# Patient Record
Sex: FEMALE | Race: BLACK OR AFRICAN AMERICAN | NOT HISPANIC OR LATINO | Employment: FULL TIME
[De-identification: names, ages, dates, MRNs, and addresses within clinical notes are randomized per-mention and may not be internally consistent; named-entity substitution may affect disease eponyms.]

---

## 2018-01-02 ENCOUNTER — CHARTING TRANS (OUTPATIENT)
Dept: OTHER | Age: 33
End: 2018-01-02

## 2018-03-14 ENCOUNTER — APPOINTMENT (OUTPATIENT)
Dept: LAB | Facility: HOSPITAL | Age: 33
End: 2018-03-14
Attending: OBSTETRICS & GYNECOLOGY
Payer: COMMERCIAL

## 2018-03-14 ENCOUNTER — OFFICE VISIT (OUTPATIENT)
Dept: OBGYN CLINIC | Facility: CLINIC | Age: 33
End: 2018-03-14

## 2018-03-14 VITALS
HEART RATE: 104 BPM | SYSTOLIC BLOOD PRESSURE: 144 MMHG | BODY MASS INDEX: 44 KG/M2 | DIASTOLIC BLOOD PRESSURE: 99 MMHG | WEIGHT: 250.19 LBS

## 2018-03-14 DIAGNOSIS — Z11.3 SCREEN FOR STD (SEXUALLY TRANSMITTED DISEASE): ICD-10-CM

## 2018-03-14 DIAGNOSIS — Z01.419 ENCOUNTER FOR GYNECOLOGICAL EXAMINATION WITHOUT ABNORMAL FINDING: Primary | ICD-10-CM

## 2018-03-14 DIAGNOSIS — D25.1 INTRAMURAL LEIOMYOMA OF UTERUS: ICD-10-CM

## 2018-03-14 PROCEDURE — 86780 TREPONEMA PALLIDUM: CPT

## 2018-03-14 PROCEDURE — 99212 OFFICE O/P EST SF 10 MIN: CPT | Performed by: OBSTETRICS & GYNECOLOGY

## 2018-03-14 PROCEDURE — 87389 HIV-1 AG W/HIV-1&-2 AB AG IA: CPT

## 2018-03-14 PROCEDURE — 87340 HEPATITIS B SURFACE AG IA: CPT

## 2018-03-14 PROCEDURE — 99395 PREV VISIT EST AGE 18-39: CPT | Performed by: OBSTETRICS & GYNECOLOGY

## 2018-03-14 PROCEDURE — 36415 COLL VENOUS BLD VENIPUNCTURE: CPT

## 2018-03-14 RX ORDER — MOXIFLOXACIN 5 MG/ML
SOLUTION/ DROPS OPHTHALMIC
Refills: 0 | COMMUNITY
Start: 2018-01-03 | End: 2018-03-14

## 2018-03-14 NOTE — PROGRESS NOTES
Belgica Araya is a 35year old female  Patient's last menstrual period was 2018. Patient presents with:  Gyn Exam: Annual  Pt has h/o uterine fibroids.   Her menses are monthly but on day #2-3 out of her 4-5 day menses she is changing a super headaches, extremity weakness or numbness. Psychiatric: denies depression or anxiety. Endocrine:   denies excessive thirst or urination. Heme/Lymph:  denies history of anemia, easy bruising or bleeding.       PHYSICAL EXAM:   Constitutional: well develop PELVIS (TRANSABDOMINAL PELVIS)  ([id number])

## 2018-03-15 LAB
HBV SURFACE AG SERPL QL IA: NONREACTIVE
HIV1+2 AB SERPL QL IA: NONREACTIVE

## 2018-03-16 LAB
C TRACH DNA SPEC QL NAA+PROBE: NEGATIVE
N GONORRHOEA DNA SPEC QL NAA+PROBE: NEGATIVE
T PALLIDUM AB SER QL: NEGATIVE
T VAGINALIS RRNA SPEC QL NAA+PROBE: NEGATIVE

## 2018-03-21 ENCOUNTER — TELEPHONE (OUTPATIENT)
Dept: OBGYN CLINIC | Facility: CLINIC | Age: 33
End: 2018-03-21

## 2018-03-21 NOTE — TELEPHONE ENCOUNTER
----- Message from Anil Singh MD sent at 3/18/2018  3:56 PM CDT -----  Negative syphylis,hepatitis,gc/chlamydia,HIV ,call pt

## 2018-04-27 ENCOUNTER — HOSPITAL ENCOUNTER (OUTPATIENT)
Dept: ULTRASOUND IMAGING | Facility: HOSPITAL | Age: 33
Discharge: HOME | End: 2018-04-27
Attending: OBSTETRICS & GYNECOLOGY
Payer: COMMERCIAL

## 2018-04-27 DIAGNOSIS — D25.1 INTRAMURAL LEIOMYOMA OF UTERUS: ICD-10-CM

## 2018-04-27 PROCEDURE — 76856 US EXAM PELVIC COMPLETE: CPT | Performed by: OBSTETRICS & GYNECOLOGY

## 2018-04-27 PROCEDURE — 76830 TRANSVAGINAL US NON-OB: CPT | Performed by: OBSTETRICS & GYNECOLOGY

## 2018-05-09 ENCOUNTER — TELEPHONE (OUTPATIENT)
Dept: OBGYN CLINIC | Facility: CLINIC | Age: 33
End: 2018-05-09

## 2018-05-09 NOTE — TELEPHONE ENCOUNTER
----- Message from Marly Quispe MD sent at 5/1/2018  1:54 PM CDT -----  Pelvic US shows fibroid uterus with little change since her previous US in 2016, will continue to monitor with annual pelvic exams

## 2018-08-20 ENCOUNTER — OFFICE VISIT (OUTPATIENT)
Dept: INTERNAL MEDICINE CLINIC | Facility: CLINIC | Age: 33
End: 2018-08-20
Payer: COMMERCIAL

## 2018-08-20 VITALS
HEART RATE: 101 BPM | DIASTOLIC BLOOD PRESSURE: 96 MMHG | SYSTOLIC BLOOD PRESSURE: 135 MMHG | WEIGHT: 251.69 LBS | TEMPERATURE: 99 F | BODY MASS INDEX: 44.04 KG/M2 | HEIGHT: 63.5 IN

## 2018-08-20 DIAGNOSIS — Z00.00 ANNUAL PHYSICAL EXAM: Primary | ICD-10-CM

## 2018-08-20 DIAGNOSIS — E66.01 MORBIDLY OBESE (HCC): ICD-10-CM

## 2018-08-20 PROBLEM — D25.9 FIBROID UTERUS: Status: ACTIVE | Noted: 2018-08-20

## 2018-08-20 PROCEDURE — 99385 PREV VISIT NEW AGE 18-39: CPT | Performed by: INTERNAL MEDICINE

## 2018-08-20 NOTE — ASSESSMENT & PLAN NOTE
BMI of 43. Check A1c. Discussed with the patient obesity complications including risk for cardiovascular risk events, also degenerative arthritis of joints and spine. A healthy BMI is 25 and less.   Try to decrease white flour products and carbohydrates

## 2018-08-20 NOTE — ASSESSMENT & PLAN NOTE
Normal physical exam.  Labs ordered. Discussed with the patient about age appropriate screening and immunization. Advised healthy lifestyle with regular exercise and modification of the diet  Avoid processed, frozen food and sweetened beverages.

## 2018-08-20 NOTE — PROGRESS NOTES
Lo Holland is a 35year old female. Patient presents with:  Physical      HPI:     HPI   She is here as a new patient for annual physical. She is overall well. She has been doing weight watches for about 1 month. She had been slow on weight loss.  She d and headaches. Endo/Heme/Allergies: Negative for environmental allergies. Psychiatric/Behavioral: Negative for depression. The patient is not nervous/anxious and does not have insomnia.           EXAM:   BP (!) 135/96 (BP Location: Right arm, Cuff Size: This Visit        Other    Morbidly obese ([address]     BMI of 43. Check A1c. Discussed with the patient obesity complications including risk for cardiovascular risk events, also degenerative arthritis of joints and spine. A healthy BMI is 25 and less.   Try

## 2018-09-01 ENCOUNTER — LAB ENCOUNTER (OUTPATIENT)
Dept: LAB | Facility: HOSPITAL | Age: 33
End: 2018-09-01
Attending: INTERNAL MEDICINE
Payer: COMMERCIAL

## 2018-09-01 DIAGNOSIS — Z00.00 ANNUAL PHYSICAL EXAM: ICD-10-CM

## 2018-09-01 DIAGNOSIS — E66.01 MORBIDLY OBESE (HCC): ICD-10-CM

## 2018-09-01 LAB
ALBUMIN SERPL BCP-MCNC: 3.8 G/DL (ref 3.5–4.8)
ALBUMIN/GLOB SERPL: 1.1 {RATIO} (ref 1–2)
ALP SERPL-CCNC: 64 U/L (ref 32–100)
ALT SERPL-CCNC: 13 U/L (ref 14–54)
ANION GAP SERPL CALC-SCNC: 7 MMOL/L (ref 0–18)
AST SERPL-CCNC: 19 U/L (ref 15–41)
BASOPHILS # BLD: 0 K/UL (ref 0–0.2)
BASOPHILS NFR BLD: 1 %
BILIRUB SERPL-MCNC: 0.5 MG/DL (ref 0.3–1.2)
BUN SERPL-MCNC: 10 MG/DL (ref 8–20)
BUN/CREAT SERPL: 10.6 (ref 10–20)
CALCIUM SERPL-MCNC: 9.2 MG/DL (ref 8.5–10.5)
CHLORIDE SERPL-SCNC: 106 MMOL/L (ref 95–110)
CHOLEST SERPL-MCNC: 177 MG/DL (ref 110–200)
CO2 SERPL-SCNC: 26 MMOL/L (ref 22–32)
CREAT SERPL-MCNC: 0.94 MG/DL (ref 0.5–1.5)
EOSINOPHIL # BLD: 0.1 K/UL (ref 0–0.7)
EOSINOPHIL NFR BLD: 3 %
ERYTHROCYTE [DISTWIDTH] IN BLOOD BY AUTOMATED COUNT: 15.6 % (ref 11–15)
GLOBULIN PLAS-MCNC: 3.4 G/DL (ref 2.5–3.7)
GLUCOSE SERPL-MCNC: 94 MG/DL (ref 70–99)
HBA1C MFR BLD: 5.2 % (ref 4–6)
HCT VFR BLD AUTO: 41.9 % (ref 35–48)
HDLC SERPL-MCNC: 64 MG/DL
HGB BLD-MCNC: 13.6 G/DL (ref 12–16)
LDLC SERPL CALC-MCNC: 102 MG/DL (ref 0–99)
LYMPHOCYTES # BLD: 2 K/UL (ref 1–4)
LYMPHOCYTES NFR BLD: 42 %
MCH RBC QN AUTO: 26.6 PG (ref 27–32)
MCHC RBC AUTO-ENTMCNC: 32.5 G/DL (ref 32–37)
MCV RBC AUTO: 81.8 FL (ref 80–100)
MONOCYTES # BLD: 0.6 K/UL (ref 0–1)
MONOCYTES NFR BLD: 12 %
NEUTROPHILS # BLD AUTO: 2.1 K/UL (ref 1.8–7.7)
NEUTROPHILS NFR BLD: 43 %
NONHDLC SERPL-MCNC: 113 MG/DL
OSMOLALITY UR CALC.SUM OF ELEC: 287 MOSM/KG (ref 275–295)
PATIENT FASTING: YES
PLATELET # BLD AUTO: 297 K/UL (ref 140–400)
PMV BLD AUTO: 11.1 FL (ref 7.4–10.3)
POTASSIUM SERPL-SCNC: 4.2 MMOL/L (ref 3.3–5.1)
PROT SERPL-MCNC: 7.2 G/DL (ref 5.9–8.4)
RBC # BLD AUTO: 5.12 M/UL (ref 3.7–5.4)
SODIUM SERPL-SCNC: 139 MMOL/L (ref 136–144)
TRIGL SERPL-MCNC: 55 MG/DL (ref 1–149)
TSH SERPL-ACNC: 1.53 UIU/ML (ref 0.45–5.33)
WBC # BLD AUTO: 4.9 K/UL (ref 4–11)

## 2018-09-01 PROCEDURE — 84443 ASSAY THYROID STIM HORMONE: CPT

## 2018-09-01 PROCEDURE — 83036 HEMOGLOBIN GLYCOSYLATED A1C: CPT

## 2018-09-01 PROCEDURE — 80061 LIPID PANEL: CPT

## 2018-09-01 PROCEDURE — 80053 COMPREHEN METABOLIC PANEL: CPT

## 2018-09-01 PROCEDURE — 85025 COMPLETE CBC W/AUTO DIFF WBC: CPT

## 2018-09-01 PROCEDURE — 36415 COLL VENOUS BLD VENIPUNCTURE: CPT

## 2018-11-02 VITALS
BODY MASS INDEX: 43.19 KG/M2 | HEIGHT: 63 IN | RESPIRATION RATE: 20 BRPM | DIASTOLIC BLOOD PRESSURE: 80 MMHG | TEMPERATURE: 98.2 F | WEIGHT: 243.76 LBS | HEART RATE: 88 BPM | SYSTOLIC BLOOD PRESSURE: 120 MMHG

## 2018-12-12 ENCOUNTER — TELEPHONE (OUTPATIENT)
Dept: OBGYN CLINIC | Facility: CLINIC | Age: 33
End: 2018-12-12

## 2018-12-12 ENCOUNTER — OFFICE VISIT (OUTPATIENT)
Dept: OBGYN CLINIC | Facility: CLINIC | Age: 33
End: 2018-12-12
Payer: COMMERCIAL

## 2018-12-12 VITALS
DIASTOLIC BLOOD PRESSURE: 84 MMHG | WEIGHT: 246 LBS | SYSTOLIC BLOOD PRESSURE: 128 MMHG | HEART RATE: 98 BPM | BODY MASS INDEX: 43 KG/M2

## 2018-12-12 DIAGNOSIS — G43.829 MENSTRUAL MIGRAINE WITHOUT STATUS MIGRAINOSUS, NOT INTRACTABLE: Primary | ICD-10-CM

## 2018-12-12 DIAGNOSIS — G43.A0 CYCLICAL VOMITING WITH NAUSEA, INTRACTABILITY OF VOMITING NOT SPECIFIED: ICD-10-CM

## 2018-12-12 DIAGNOSIS — R03.0 ELEVATED BLOOD PRESSURE READING IN OFFICE WITHOUT DIAGNOSIS OF HYPERTENSION: ICD-10-CM

## 2018-12-12 PROCEDURE — 99213 OFFICE O/P EST LOW 20 MIN: CPT | Performed by: OBSTETRICS & GYNECOLOGY

## 2018-12-12 NOTE — PROGRESS NOTES
Felix Bryan    1/17/1985       Patient presents with:  Gyn Problem: MENSTRUAL MIGRAINES / Ambrosio O[place] / VOMITING  For the past 4-6 months she has had severe migraines with nausea and vomiting- she has n/v for the first 2 days of menses.   She monitors what s intractability of vomiting not specified    Other orders  -     Ondansetron 4 MG Oral Film; Take 1 tablet by mouth 3 (three) times daily for 7 days.       Recommend suppressing ovulation to see if the migraines are being caused by hormonal fluctuations rela

## 2018-12-12 NOTE — TELEPHONE ENCOUNTER
Ten states rx was written as film, but they only have tabs. Per CAP, she wants tabs. Pharmacy notified.

## 2018-12-30 ENCOUNTER — HOSPITAL ENCOUNTER (OUTPATIENT)
Age: 33
Discharge: HOME | End: 2018-12-30
Attending: EMERGENCY MEDICINE
Payer: COMMERCIAL

## 2018-12-30 VITALS
DIASTOLIC BLOOD PRESSURE: 91 MMHG | WEIGHT: 246 LBS | RESPIRATION RATE: 16 BRPM | SYSTOLIC BLOOD PRESSURE: 132 MMHG | HEART RATE: 101 BPM | OXYGEN SATURATION: 99 % | BODY MASS INDEX: 43 KG/M2 | TEMPERATURE: 99 F

## 2018-12-30 DIAGNOSIS — J01.90 ACUTE SINUSITIS, RECURRENCE NOT SPECIFIED, UNSPECIFIED LOCATION: Primary | ICD-10-CM

## 2018-12-30 PROCEDURE — 99214 OFFICE O/P EST MOD 30 MIN: CPT

## 2018-12-30 PROCEDURE — 99213 OFFICE O/P EST LOW 20 MIN: CPT

## 2018-12-30 RX ORDER — AZITHROMYCIN 250 MG/1
TABLET, FILM COATED ORAL
Qty: 1 PACKAGE | Refills: 0 | Status: SHIPPED | OUTPATIENT
Start: 2018-12-30 | End: 2019-01-04

## 2018-12-30 RX ORDER — FLUTICASONE PROPIONATE 50 MCG
2 SPRAY, SUSPENSION (ML) NASAL DAILY
Qty: 16 G | Refills: 0 | Status: SHIPPED | OUTPATIENT
Start: 2018-12-30 | End: 2019-01-29

## 2018-12-30 NOTE — ED PROVIDER NOTES
Patient Seen in: [facility] In [address] [place] [address]    History   Patient presents with:  Ear Problem Pain (neurosensory)    Stated Complaint: sinus    HPI    Patient presents to the immediate care center today complaining of sinus congestion, f motion without pain. Neck supple. No neck rigidity. Normal range of motion present. Cardiovascular: Normal rate and regular rhythm. No murmur heard. Pulmonary/Chest: Effort normal and breath sounds normal. No respiratory distress. Abdominal: Soft.  Chandler Frye

## 2019-09-23 ENCOUNTER — TELEPHONE (OUTPATIENT)
Dept: OBGYN CLINIC | Facility: CLINIC | Age: 34
End: 2019-09-23

## 2019-09-23 ENCOUNTER — LAB ENCOUNTER (OUTPATIENT)
Dept: LAB | Facility: HOSPITAL | Age: 34
End: 2019-09-23
Attending: OBSTETRICS & GYNECOLOGY
Payer: COMMERCIAL

## 2019-09-23 ENCOUNTER — OFFICE VISIT (OUTPATIENT)
Dept: OBGYN CLINIC | Facility: CLINIC | Age: 34
End: 2019-09-23
Payer: COMMERCIAL

## 2019-09-23 VITALS
HEART RATE: 87 BPM | BODY MASS INDEX: 43.51 KG/M2 | HEIGHT: 63.5 IN | DIASTOLIC BLOOD PRESSURE: 88 MMHG | WEIGHT: 248.63 LBS | SYSTOLIC BLOOD PRESSURE: 116 MMHG

## 2019-09-23 DIAGNOSIS — D21.9 FIBROIDS: ICD-10-CM

## 2019-09-23 DIAGNOSIS — Z01.419 ENCOUNTER FOR GYNECOLOGICAL EXAMINATION WITHOUT ABNORMAL FINDING: Primary | ICD-10-CM

## 2019-09-23 DIAGNOSIS — Z11.3 SCREEN FOR STD (SEXUALLY TRANSMITTED DISEASE): ICD-10-CM

## 2019-09-23 LAB
HBV SURFACE AG SER-ACNC: <0.1 [IU]/L
HBV SURFACE AG SERPL QL IA: NONREACTIVE

## 2019-09-23 PROCEDURE — 86780 TREPONEMA PALLIDUM: CPT

## 2019-09-23 PROCEDURE — 36415 COLL VENOUS BLD VENIPUNCTURE: CPT

## 2019-09-23 PROCEDURE — 87389 HIV-1 AG W/HIV-1&-2 AB AG IA: CPT

## 2019-09-23 PROCEDURE — 99395 PREV VISIT EST AGE 18-39: CPT | Performed by: OBSTETRICS & GYNECOLOGY

## 2019-09-23 PROCEDURE — 87340 HEPATITIS B SURFACE AG IA: CPT

## 2019-09-23 RX ORDER — AZITHROMYCIN 250 MG/1
TABLET, FILM COATED ORAL
Refills: 0 | COMMUNITY
Start: 2019-09-06 | End: 2019-10-23

## 2019-09-23 RX ORDER — FLUCONAZOLE 150 MG/1
TABLET ORAL
Refills: 0 | COMMUNITY
Start: 2019-09-06 | End: 2019-10-23

## 2019-09-23 NOTE — PROGRESS NOTES
Latrell Queen is a 29year old female [facility] Patient's last menstrual period was 08/22/2019. Patient presents with:  Gyn Exam: annual exam  Her cycles are  Regular but now every 32-35 days. She states that she had a normal  TSH.   I reassured her that sh partner violence:        Fear of current or ex partner: Not on file        Emotionally abused: Not on file        Physically abused: Not on file        Forced sexual activity: Not on file    Other Topics      Concerns:        Not on file    Social History bilaterally  Cardiovascular: regular rate and rhythm, no significant murmur  Abdomen:  soft, nontender, nondistended, no masses  Skin/Hair: no unusual rashes or bruises  Extremities: no edema, no cyanosis  Psychiatric:  Oriented to time, place, person and

## 2019-09-24 LAB
C TRACH DNA SPEC QL NAA+PROBE: NEGATIVE
HPV I/H RISK 1 DNA SPEC QL NAA+PROBE: NEGATIVE
N GONORRHOEA DNA SPEC QL NAA+PROBE: NEGATIVE

## 2019-09-25 LAB — T PALLIDUM AB SER QL: NEGATIVE

## 2019-09-26 LAB — LAST PAP RESULT: NORMAL

## 2019-10-21 ENCOUNTER — HOSPITAL ENCOUNTER (OUTPATIENT)
Dept: ULTRASOUND IMAGING | Facility: HOSPITAL | Age: 34
Discharge: HOME | End: 2019-10-21
Attending: OBSTETRICS & GYNECOLOGY
Payer: COMMERCIAL

## 2019-10-21 DIAGNOSIS — D21.9 FIBROIDS: ICD-10-CM

## 2019-10-21 PROCEDURE — 76830 TRANSVAGINAL US NON-OB: CPT | Performed by: OBSTETRICS & GYNECOLOGY

## 2019-10-21 PROCEDURE — 76856 US EXAM PELVIC COMPLETE: CPT | Performed by: OBSTETRICS & GYNECOLOGY

## 2019-10-23 ENCOUNTER — OFFICE VISIT (OUTPATIENT)
Dept: FAMILY MEDICINE CLINIC | Facility: CLINIC | Age: 34
End: 2019-10-23
Payer: COMMERCIAL

## 2019-10-23 VITALS
OXYGEN SATURATION: 98 % | HEART RATE: 100 BPM | WEIGHT: 253 LBS | SYSTOLIC BLOOD PRESSURE: 122 MMHG | DIASTOLIC BLOOD PRESSURE: 60 MMHG | BODY MASS INDEX: 44.83 KG/M2 | HEIGHT: 63 IN

## 2019-10-23 DIAGNOSIS — Z71.3 WEIGHT LOSS COUNSELING, ENCOUNTER FOR: ICD-10-CM

## 2019-10-23 DIAGNOSIS — Z00.00 WELLNESS EXAMINATION: Primary | ICD-10-CM

## 2019-10-23 DIAGNOSIS — Z00.00 HEALTHCARE MAINTENANCE: ICD-10-CM

## 2019-10-23 PROCEDURE — 99385 PREV VISIT NEW AGE 18-39: CPT | Performed by: FAMILY MEDICINE

## 2019-10-23 NOTE — PROGRESS NOTES
CC: Annual Physical Exam    HPI:   Christy Pena is a 29year old female who presents for a complete physical exam.    HCM  -Diet:  Well-balanced.   -Exercise regularly  -Mental Health: denies any depression or anxiety sx  -Skin care:  no concerning lesion Respiratory: Negative for cough and shortness of breath. Cardiovascular: Negative for chest pain, palpitations and leg swelling. Gastrointestinal: Negative for vomiting, abdominal pain, diarrhea and constipation.    Musculoskeletal: Negative for join Platelet, No Differential [E]      Comp Metabolic Panel (14) [E]      Hemoglobin A1C (Glycohemoglobin) [E]      Lipid Panel [E]      TSH W Reflex To Free T4 [E]      Diagnoses and all orders for this visit:    Wellness examination  Healthcare maintenance

## 2019-12-12 ENCOUNTER — TELEPHONE (OUTPATIENT)
Dept: FAMILY MEDICINE CLINIC | Facility: CLINIC | Age: 34
End: 2019-12-12

## 2020-02-02 ENCOUNTER — APPOINTMENT (OUTPATIENT)
Dept: LAB | Facility: REFERENCE LAB | Age: 35
End: 2020-02-02
Attending: FAMILY MEDICINE
Payer: COMMERCIAL

## 2020-02-02 DIAGNOSIS — Z00.00 HEALTHCARE MAINTENANCE: ICD-10-CM

## 2020-02-02 LAB
ALBUMIN SERPL-MCNC: 3.4 G/DL (ref 3.4–5)
ALBUMIN/GLOB SERPL: 0.9 {RATIO} (ref 1–2)
ALP LIVER SERPL-CCNC: 75 U/L (ref 37–98)
ALT SERPL-CCNC: 14 U/L (ref 13–56)
ANION GAP SERPL CALC-SCNC: 6 MMOL/L (ref 0–18)
AST SERPL-CCNC: 9 U/L (ref 15–37)
BILIRUB SERPL-MCNC: 0.4 MG/DL (ref 0.1–2)
BUN BLD-MCNC: 10 MG/DL (ref 7–18)
BUN/CREAT SERPL: 10.3 (ref 10–20)
CALCIUM BLD-MCNC: 8.9 MG/DL (ref 8.5–10.1)
CHLORIDE SERPL-SCNC: 108 MMOL/L (ref 98–112)
CHOLEST SMN-MCNC: 171 MG/DL (ref ?–200)
CO2 SERPL-SCNC: 26 MMOL/L (ref 21–32)
CREAT BLD-MCNC: 0.97 MG/DL (ref 0.55–1.02)
DEPRECATED RDW RBC AUTO: 42.8 FL (ref 35.1–46.3)
ERYTHROCYTE [DISTWIDTH] IN BLOOD BY AUTOMATED COUNT: 14.8 % (ref 11–15)
EST. AVERAGE GLUCOSE BLD GHB EST-MCNC: 100 MG/DL (ref 68–126)
GLOBULIN PLAS-MCNC: 3.8 G/DL (ref 2.8–4.4)
GLUCOSE BLD-MCNC: 81 MG/DL (ref 70–99)
HBA1C MFR BLD HPLC: 5.1 % (ref ?–5.7)
HCT VFR BLD AUTO: 41.8 % (ref 35–48)
HDLC SERPL-MCNC: 69 MG/DL (ref 40–59)
HGB BLD-MCNC: 13.9 G/DL (ref 12–16)
LDLC SERPL CALC-MCNC: 89 MG/DL (ref ?–100)
M PROTEIN MFR SERPL ELPH: 7.2 G/DL (ref 6.4–8.2)
MCH RBC QN AUTO: 27.1 PG (ref 26–34)
MCHC RBC AUTO-ENTMCNC: 33.3 G/DL (ref 31–37)
MCV RBC AUTO: 81.5 FL (ref 80–100)
NONHDLC SERPL-MCNC: 102 MG/DL (ref ?–130)
OSMOLALITY SERPL CALC.SUM OF ELEC: 288 MOSM/KG (ref 275–295)
PATIENT FASTING Y/N/NP: YES
PATIENT FASTING Y/N/NP: YES
PLATELET # BLD AUTO: 298 10(3)UL (ref 150–450)
POTASSIUM SERPL-SCNC: 3.9 MMOL/L (ref 3.5–5.1)
RBC # BLD AUTO: 5.13 X10(6)UL (ref 3.8–5.3)
SODIUM SERPL-SCNC: 140 MMOL/L (ref 136–145)
TRIGL SERPL-MCNC: 64 MG/DL (ref 30–149)
TSI SER-ACNC: 1.06 MIU/ML (ref 0.36–3.74)
VLDLC SERPL CALC-MCNC: 13 MG/DL (ref 0–30)
WBC # BLD AUTO: 4.9 X10(3) UL (ref 4–11)

## 2020-02-02 PROCEDURE — 84443 ASSAY THYROID STIM HORMONE: CPT

## 2020-02-02 PROCEDURE — 80053 COMPREHEN METABOLIC PANEL: CPT

## 2020-02-02 PROCEDURE — 83036 HEMOGLOBIN GLYCOSYLATED A1C: CPT

## 2020-02-02 PROCEDURE — 85027 COMPLETE CBC AUTOMATED: CPT

## 2020-02-02 PROCEDURE — 36415 COLL VENOUS BLD VENIPUNCTURE: CPT

## 2020-02-02 PROCEDURE — 80061 LIPID PANEL: CPT

## 2020-02-05 ENCOUNTER — TELEPHONE (OUTPATIENT)
Dept: FAMILY MEDICINE CLINIC | Facility: CLINIC | Age: 35
End: 2020-02-05

## 2020-02-05 NOTE — TELEPHONE ENCOUNTER
Results were already viewed by the patient under My Chart but were discussed with her as well, she verbalized understanding.

## 2020-02-05 NOTE — TELEPHONE ENCOUNTER
----- Message from Laron Murillo MD sent at 2/3/2020  5:45 PM CST -----  Please let her know that her labs are normal (cholesterol, thyroid, diabetes test, blood count, kidney, liver, eletrolytes)

## 2020-11-06 ENCOUNTER — OFFICE VISIT (OUTPATIENT)
Dept: OBGYN CLINIC | Facility: CLINIC | Age: 35
End: 2020-11-06
Payer: COMMERCIAL

## 2020-11-06 VITALS
HEIGHT: 63 IN | DIASTOLIC BLOOD PRESSURE: 90 MMHG | HEART RATE: 96 BPM | BODY MASS INDEX: 42.77 KG/M2 | WEIGHT: 241.38 LBS | SYSTOLIC BLOOD PRESSURE: 130 MMHG

## 2020-11-06 DIAGNOSIS — Z11.3 SCREENING EXAMINATION FOR STD (SEXUALLY TRANSMITTED DISEASE): ICD-10-CM

## 2020-11-06 DIAGNOSIS — Z01.419 ENCOUNTER FOR GYNECOLOGICAL EXAMINATION WITHOUT ABNORMAL FINDING: Primary | ICD-10-CM

## 2020-11-06 PROCEDURE — 99395 PREV VISIT EST AGE 18-39: CPT | Performed by: OBSTETRICS & GYNECOLOGY

## 2020-11-06 PROCEDURE — 3008F BODY MASS INDEX DOCD: CPT | Performed by: OBSTETRICS & GYNECOLOGY

## 2020-11-06 PROCEDURE — 3080F DIAST BP >= 90 MM HG: CPT | Performed by: OBSTETRICS & GYNECOLOGY

## 2020-11-06 PROCEDURE — 99072 ADDL SUPL MATRL&STAF TM PHE: CPT | Performed by: OBSTETRICS & GYNECOLOGY

## 2020-11-06 PROCEDURE — 3075F SYST BP GE 130 - 139MM HG: CPT | Performed by: OBSTETRICS & GYNECOLOGY

## 2020-11-06 NOTE — PROGRESS NOTES
Soni Umaña is a 28year old female  Patient's last menstrual period was 10/20/2020. Patient presents with:  Gyn Exam: ANNUAL EXAM  .     Her cycles are regular. She has no complaints.       OBSTETRICS HISTORY:  OB History     T0    L Not on file    Other Topics      Concerns:        Not on file    Social History Narrative      Not on file      FAMILY HISTORY:  Family History   Problem Relation Age of Onset   • Hypertension Father    • Hypertension Mother    • Diabetes Mother    • Cance Genitalia: normal appearance, hair distribution, and no lesions  Urethral Meatus:  normal in size, location, without lesions and prolapse  Bladder:  No fullness, masses or tenderness  Vagina:  Normal appearance without lesions, no abnormal discharge  [place]

## 2020-11-09 ENCOUNTER — OFFICE VISIT (OUTPATIENT)
Dept: FAMILY MEDICINE CLINIC | Facility: CLINIC | Age: 35
End: 2020-11-09
Payer: COMMERCIAL

## 2020-11-09 VITALS
HEIGHT: 63 IN | DIASTOLIC BLOOD PRESSURE: 82 MMHG | OXYGEN SATURATION: 99 % | WEIGHT: 242 LBS | HEART RATE: 104 BPM | SYSTOLIC BLOOD PRESSURE: 126 MMHG | BODY MASS INDEX: 42.88 KG/M2

## 2020-11-09 DIAGNOSIS — Z20.822 EXPOSURE TO COVID-19 VIRUS: ICD-10-CM

## 2020-11-09 DIAGNOSIS — Z00.00 WELLNESS EXAMINATION: Primary | ICD-10-CM

## 2020-11-09 DIAGNOSIS — Z23 NEED FOR VACCINATION: ICD-10-CM

## 2020-11-09 DIAGNOSIS — Z00.00 HEALTHCARE MAINTENANCE: ICD-10-CM

## 2020-11-09 PROCEDURE — 90686 IIV4 VACC NO PRSV 0.5 ML IM: CPT | Performed by: FAMILY MEDICINE

## 2020-11-09 PROCEDURE — 99072 ADDL SUPL MATRL&STAF TM PHE: CPT | Performed by: FAMILY MEDICINE

## 2020-11-09 PROCEDURE — 3008F BODY MASS INDEX DOCD: CPT | Performed by: FAMILY MEDICINE

## 2020-11-09 PROCEDURE — 3074F SYST BP LT 130 MM HG: CPT | Performed by: FAMILY MEDICINE

## 2020-11-09 PROCEDURE — 99395 PREV VISIT EST AGE 18-39: CPT | Performed by: FAMILY MEDICINE

## 2020-11-09 PROCEDURE — 3079F DIAST BP 80-89 MM HG: CPT | Performed by: FAMILY MEDICINE

## 2020-11-09 PROCEDURE — 90471 IMMUNIZATION ADMIN: CPT | Performed by: FAMILY MEDICINE

## 2020-11-09 NOTE — PROGRESS NOTES
CC: Annual Physical Exam    HPI:   Gabi Dobson is a 28year old female who presents for a complete physical exam.    HCM  -Diet:  Well-balanced.  Started a diet program with work and has lost 10lbs so far!!  -Exercise active  -Mental Health: denies any de Negative for vomiting, abdominal pain, diarrhea and constipation. Musculoskeletal: Negative for joint pain. Neurological: Negative for headaches. All other systems reviewed and are negative.            PHYSICAL EXAM:   /82   Pulse 104   Ht 63\" [E]      Diagnoses and all orders for this visit:    Wellness examination  Healthcare maintenance  -     CBC WITH DIFFERENTIAL WITH PLATELET  -     COMP METABOLIC PANEL (14); Future  -     LIPID PANEL;  Future  -Vital signs reviewed and wnl  -PE wnl  -Body

## 2020-11-19 ENCOUNTER — LAB ENCOUNTER (OUTPATIENT)
Dept: LAB | Facility: HOSPITAL | Age: 35
End: 2020-11-19
Attending: OBSTETRICS & GYNECOLOGY
Payer: COMMERCIAL

## 2020-11-19 DIAGNOSIS — Z20.822 EXPOSURE TO COVID-19 VIRUS: ICD-10-CM

## 2020-11-19 DIAGNOSIS — Z11.3 SCREENING EXAMINATION FOR STD (SEXUALLY TRANSMITTED DISEASE): ICD-10-CM

## 2020-11-19 PROCEDURE — 87389 HIV-1 AG W/HIV-1&-2 AB AG IA: CPT

## 2020-11-19 PROCEDURE — 86780 TREPONEMA PALLIDUM: CPT

## 2020-11-19 PROCEDURE — 87340 HEPATITIS B SURFACE AG IA: CPT

## 2020-11-19 PROCEDURE — 36415 COLL VENOUS BLD VENIPUNCTURE: CPT

## 2020-11-19 PROCEDURE — 86769 SARS-COV-2 COVID-19 ANTIBODY: CPT

## 2020-11-22 ENCOUNTER — HOSPITAL ENCOUNTER (EMERGENCY)
Age: 35
Discharge: HOME | End: 2020-11-22

## 2020-11-22 VITALS
RESPIRATION RATE: 18 BRPM | HEART RATE: 90 BPM | DIASTOLIC BLOOD PRESSURE: 99 MMHG | SYSTOLIC BLOOD PRESSURE: 140 MMHG | OXYGEN SATURATION: 98 % | TEMPERATURE: 98.6 F

## 2020-11-22 DIAGNOSIS — I10 HYPERTENSION, UNSPECIFIED TYPE: Primary | ICD-10-CM

## 2020-11-22 LAB
APPEARANCE UR: CLEAR
BILIRUB UR QL STRIP: NEGATIVE
COLOR UR: YELLOW
GLUCOSE UR STRIP-MCNC: NEGATIVE MG/DL
HCG UR QL: NEGATIVE
HGB UR QL STRIP: NEGATIVE
KETONES UR STRIP-MCNC: NEGATIVE MG/DL
LEUKOCYTE ESTERASE UR QL STRIP: NEGATIVE
NITRITE UR QL STRIP: NEGATIVE
PH UR STRIP: 5.5 UNITS (ref 5–7)
PROT UR STRIP-MCNC: NEGATIVE MG/DL
SP GR UR STRIP: 1.01 (ref 1–1.03)
UROBILINOGEN UR STRIP-MCNC: 0.2 MG/DL (ref 0–1)

## 2020-11-22 PROCEDURE — 84703 CHORIONIC GONADOTROPIN ASSAY: CPT

## 2020-11-22 PROCEDURE — 81003 URINALYSIS AUTO W/O SCOPE: CPT

## 2020-11-22 PROCEDURE — 99283 EMERGENCY DEPT VISIT LOW MDM: CPT | Performed by: NURSE PRACTITIONER

## 2020-11-22 PROCEDURE — 99283 EMERGENCY DEPT VISIT LOW MDM: CPT

## 2020-11-22 ASSESSMENT — ENCOUNTER SYMPTOMS
NAUSEA: 0
TROUBLE SWALLOWING: 0
EYE PAIN: 0
WEAKNESS: 0
EYE ITCHING: 0
COUGH: 0
SLEEP DISTURBANCE: 0
FACIAL SWELLING: 0
TREMORS: 0
NUMBNESS: 0
ACTIVITY CHANGE: 0
STRIDOR: 0
DIARRHEA: 0
VOMITING: 0
DIAPHORESIS: 0
FATIGUE: 0
LIGHT-HEADEDNESS: 0
NERVOUS/ANXIOUS: 0
FACIAL ASYMMETRY: 0
CONFUSION: 0
SEIZURES: 0
BACK PAIN: 0
APPETITE CHANGE: 0
ABDOMINAL PAIN: 0
SORE THROAT: 0
BRUISES/BLEEDS EASILY: 0
COLOR CHANGE: 0
SHORTNESS OF BREATH: 0
EYE DISCHARGE: 0
HEADACHES: 0
WHEEZING: 0
FEVER: 0
SPEECH DIFFICULTY: 0
DIZZINESS: 0
CHILLS: 0
SINUS PAIN: 0

## 2020-11-22 ASSESSMENT — PAIN SCALES - GENERAL: PAINLEVEL_OUTOF10: 0

## 2020-11-23 ENCOUNTER — TELEPHONE (OUTPATIENT)
Dept: FAMILY MEDICINE CLINIC | Facility: CLINIC | Age: 35
End: 2020-11-23

## 2020-11-23 NOTE — TELEPHONE ENCOUNTER
Spoke to patient. Lauren speaking in full sentences in no apparent distress. Denies chest pain, SOB, difficulty breathing, numbness/tingling in extremities, headache, blurry vision.     She endorses that prior to ER visit, the night before she ate a whole

## 2020-11-23 NOTE — TELEPHONE ENCOUNTER
Patient states she was seen at [facility] ER over the weekend she states she has been under some stress due to recent family problems.  Patient states her blood pressure was 149/102 when she was seen and she was told to make a follow yo appoi

## 2020-12-09 ENCOUNTER — PATIENT MESSAGE (OUTPATIENT)
Dept: FAMILY MEDICINE CLINIC | Facility: CLINIC | Age: 35
End: 2020-12-09

## 2020-12-11 NOTE — TELEPHONE ENCOUNTER
Spoke to patient. Offered appt on Monday 12/14 with Dr. Denise Fajardo at 3:20, but unable to make it in due to schedule conflict. Patient scheduled Thu 12/17 with Dr. Denise Fajardo at 2pm.    In the interim, continue monitoring and logging BP readings.  Decrease sa

## 2020-12-17 ENCOUNTER — OFFICE VISIT (OUTPATIENT)
Dept: FAMILY MEDICINE CLINIC | Facility: CLINIC | Age: 35
End: 2020-12-17
Payer: COMMERCIAL

## 2020-12-17 VITALS
HEIGHT: 63 IN | DIASTOLIC BLOOD PRESSURE: 80 MMHG | HEART RATE: 107 BPM | SYSTOLIC BLOOD PRESSURE: 132 MMHG | OXYGEN SATURATION: 97 % | BODY MASS INDEX: 43.23 KG/M2 | WEIGHT: 244 LBS

## 2020-12-17 DIAGNOSIS — I10 ESSENTIAL HYPERTENSION: Primary | ICD-10-CM

## 2020-12-17 PROCEDURE — 3008F BODY MASS INDEX DOCD: CPT | Performed by: FAMILY MEDICINE

## 2020-12-17 PROCEDURE — 3075F SYST BP GE 130 - 139MM HG: CPT | Performed by: FAMILY MEDICINE

## 2020-12-17 PROCEDURE — 3079F DIAST BP 80-89 MM HG: CPT | Performed by: FAMILY MEDICINE

## 2020-12-17 PROCEDURE — 99214 OFFICE O/P EST MOD 30 MIN: CPT | Performed by: FAMILY MEDICINE

## 2020-12-17 RX ORDER — LISINOPRIL 5 MG/1
5 TABLET ORAL DAILY
Qty: 30 TABLET | Refills: 1 | Status: SHIPPED | OUTPATIENT
Start: 2020-12-17 | End: 2021-01-18 | Stop reason: SINTOL

## 2020-12-17 NOTE — PROGRESS NOTES
HPI:   Patient presents with:  Blood Pressure      Benson Gonzaelz is a 28year old female presenting for:    3wks ago checked her BP at a friends's house with a reading of 140/100's.  Went to ER after being alarmed by friend but ultimately was sent home fo Known Allergies   Social History:  Social History    Tobacco Use      Smoking status: Never Smoker      Smokeless tobacco: Never Used    Alcohol use:  Yes      Alcohol/week: 0.0 standard drinks      Comment: occa    Drug use: No     Family History:  Family Tab; Take 1 tablet (5 mg total) by mouth daily. -NEWLY DX  -start meds  -watch salt intake, regular exercise, DASH/heart healthy eating  -monitor home BP daily and maintain log; if elevated reading >160/100 or <100/60 notify Md.           Return in about

## 2021-01-18 ENCOUNTER — OFFICE VISIT (OUTPATIENT)
Dept: FAMILY MEDICINE CLINIC | Facility: CLINIC | Age: 36
End: 2021-01-18
Payer: COMMERCIAL

## 2021-01-18 VITALS
HEIGHT: 63 IN | HEART RATE: 109 BPM | SYSTOLIC BLOOD PRESSURE: 112 MMHG | BODY MASS INDEX: 43.94 KG/M2 | WEIGHT: 248 LBS | DIASTOLIC BLOOD PRESSURE: 84 MMHG | OXYGEN SATURATION: 98 %

## 2021-01-18 DIAGNOSIS — M54.32 SCIATICA OF LEFT SIDE: ICD-10-CM

## 2021-01-18 DIAGNOSIS — I10 ESSENTIAL HYPERTENSION: Primary | ICD-10-CM

## 2021-01-18 PROCEDURE — 3074F SYST BP LT 130 MM HG: CPT | Performed by: FAMILY MEDICINE

## 2021-01-18 PROCEDURE — 3008F BODY MASS INDEX DOCD: CPT | Performed by: FAMILY MEDICINE

## 2021-01-18 PROCEDURE — 99214 OFFICE O/P EST MOD 30 MIN: CPT | Performed by: FAMILY MEDICINE

## 2021-01-18 PROCEDURE — 3079F DIAST BP 80-89 MM HG: CPT | Performed by: FAMILY MEDICINE

## 2021-01-18 RX ORDER — LOSARTAN POTASSIUM 25 MG/1
25 TABLET ORAL DAILY
Qty: 90 TABLET | Refills: 0 | Status: SHIPPED | OUTPATIENT
Start: 2021-01-18 | End: 2021-04-19

## 2021-01-18 RX ORDER — METHOCARBAMOL 500 MG/1
500 TABLET, FILM COATED ORAL NIGHTLY PRN
Qty: 14 TABLET | Refills: 0 | Status: SHIPPED | OUTPATIENT
Start: 2021-01-18 | End: 2021-04-19 | Stop reason: ALTCHOICE

## 2021-01-18 NOTE — PROGRESS NOTES
HPI:   Patient presents with:  Blood Pressure      Namita Lerma is a 39year old female presenting for:    HTN  -Taking meds as prescribed. Having increased dry cough for past 1mo since starting medication.  No URI/GERD sx  -Denies CP, Palpitations, Dizz tablet (500 mg total) by mouth nightly as needed (muscle spasm). 14 tablet 0      No past medical history on file. No past surgical history on file.     Lisinopril              Coughing   Social History:  Social History    Tobacco Use      Smoking stat Left upper leg: She exhibits tenderness. Neurological: No cranial nerve deficit. Skin: No rash noted.            ASSESSMENT AND PLAN:   Patient is a 39year old female who presents primarily presents for:    Diagnoses and all orders for this visit: Maintenance:  Annual Depression Screen due on 11/06/2021  Annual Physical due on 11/09/2021  Pap Smear,3 Years due on 09/23/2022  Influenza Vaccine Completed  Pneumococcal Vaccine: Birth to 64yrs Completed      Vaughn Collins MD

## 2021-04-01 ENCOUNTER — IMMUNIZATION (OUTPATIENT)
Dept: LAB | Facility: HOSPITAL | Age: 36
End: 2021-04-01
Attending: HOSPITALIST
Payer: COMMERCIAL

## 2021-04-01 DIAGNOSIS — Z23 NEED FOR VACCINATION: Primary | ICD-10-CM

## 2021-04-01 PROCEDURE — 0011A SARSCOV2 VAC 100MCG/0.5ML IM: CPT

## 2021-04-19 ENCOUNTER — OFFICE VISIT (OUTPATIENT)
Dept: FAMILY MEDICINE CLINIC | Facility: CLINIC | Age: 36
End: 2021-04-19
Payer: COMMERCIAL

## 2021-04-19 VITALS
WEIGHT: 261 LBS | BODY MASS INDEX: 46.25 KG/M2 | HEIGHT: 63 IN | HEART RATE: 99 BPM | DIASTOLIC BLOOD PRESSURE: 84 MMHG | OXYGEN SATURATION: 98 % | SYSTOLIC BLOOD PRESSURE: 132 MMHG

## 2021-04-19 DIAGNOSIS — I10 ESSENTIAL HYPERTENSION: ICD-10-CM

## 2021-04-19 PROCEDURE — 3075F SYST BP GE 130 - 139MM HG: CPT | Performed by: FAMILY MEDICINE

## 2021-04-19 PROCEDURE — 3079F DIAST BP 80-89 MM HG: CPT | Performed by: FAMILY MEDICINE

## 2021-04-19 PROCEDURE — 99214 OFFICE O/P EST MOD 30 MIN: CPT | Performed by: FAMILY MEDICINE

## 2021-04-19 PROCEDURE — 3008F BODY MASS INDEX DOCD: CPT | Performed by: FAMILY MEDICINE

## 2021-04-19 RX ORDER — LOSARTAN POTASSIUM 25 MG/1
25 TABLET ORAL DAILY
Qty: 90 TABLET | Refills: 1 | Status: SHIPPED | OUTPATIENT
Start: 2021-04-19 | End: 2021-11-10

## 2021-04-22 NOTE — PROGRESS NOTES
HPI:   Patient presents with:  Blood Pressure      Carina Person is a 39year old female presenting for:    HTN  -Taking meds as prescribed-tolerating well without SEs.   -Home BP readings are at range  -Denies CP, Palpitations, Dizziness, leg edema, SOB status: Never Smoker      Smokeless tobacco: Never Used    Alcohol use:  Yes      Alcohol/week: 0.0 standard drinks      Comment: occa    Drug use: No     Family History:  Family History   Problem Relation Age of Onset   • Hypertension Father    • Hypertens ([address]  -     HEMOGLOBIN A1C; Future  -     Pt counseled with regards to diet and exercise. Essential hypertension  -     Losartan Potassium 25 MG Oral Tab; Take 1 tablet (25 mg total) by mouth daily.   -stable; controlled  -continue meds  -watch salt int

## 2021-04-29 ENCOUNTER — IMMUNIZATION (OUTPATIENT)
Dept: LAB | Facility: HOSPITAL | Age: 36
End: 2021-04-29
Attending: EMERGENCY MEDICINE
Payer: COMMERCIAL

## 2021-04-29 DIAGNOSIS — Z23 NEED FOR VACCINATION: Primary | ICD-10-CM

## 2021-04-29 PROCEDURE — 0012A SARSCOV2 VAC 100MCG/0.5ML IM: CPT

## 2021-05-25 ENCOUNTER — PATIENT MESSAGE (OUTPATIENT)
Dept: FAMILY MEDICINE CLINIC | Facility: CLINIC | Age: 36
End: 2021-05-25

## 2021-05-26 RX ORDER — SUMATRIPTAN 25 MG/1
25 TABLET, FILM COATED ORAL EVERY 2 HOUR PRN
Qty: 6 TABLET | Refills: 0 | Status: SHIPPED | OUTPATIENT
Start: 2021-05-26 | End: 2021-06-28

## 2021-06-23 ENCOUNTER — PATIENT MESSAGE (OUTPATIENT)
Dept: FAMILY MEDICINE CLINIC | Facility: CLINIC | Age: 36
End: 2021-06-23

## 2021-06-24 ENCOUNTER — LAB ENCOUNTER (OUTPATIENT)
Dept: LAB | Facility: HOSPITAL | Age: 36
End: 2021-06-24
Attending: FAMILY MEDICINE
Payer: COMMERCIAL

## 2021-06-24 DIAGNOSIS — Z00.00 HEALTHCARE MAINTENANCE: ICD-10-CM

## 2021-06-24 PROCEDURE — 36415 COLL VENOUS BLD VENIPUNCTURE: CPT | Performed by: FAMILY MEDICINE

## 2021-06-24 PROCEDURE — 80061 LIPID PANEL: CPT

## 2021-06-24 PROCEDURE — 80053 COMPREHEN METABOLIC PANEL: CPT

## 2021-06-24 PROCEDURE — 83036 HEMOGLOBIN GLYCOSYLATED A1C: CPT

## 2021-06-24 PROCEDURE — 85025 COMPLETE CBC W/AUTO DIFF WBC: CPT | Performed by: FAMILY MEDICINE

## 2021-06-24 NOTE — TELEPHONE ENCOUNTER
Dr. Humera Woods - can we send another refill in the interim and then have her f/up upon your return from vacation? Or send sufficient amount of tabs until next appt? Please advise. LOV 4/2021; has upcoming appt in 11/2021 for annual px.

## 2021-06-30 RX ORDER — SUMATRIPTAN 25 MG/1
25 TABLET, FILM COATED ORAL EVERY 2 HOUR PRN
Qty: 30 TABLET | Refills: 0 | Status: SHIPPED | OUTPATIENT
Start: 2021-06-30 | End: 2021-11-10

## 2021-06-30 NOTE — TELEPHONE ENCOUNTER
Outpatient Medication Detail     Disp Refills Start End    SUMAtriptan Succinate 25 MG Oral Tab 30 tablet 0 6/30/2021     Sig - Route: Take 1 tablet (25 mg total) by mouth every 2 (two) hours as needed for Migraine (MAX 2 TABLETS PER DAY).  - Oral    Sent t

## 2021-07-16 ENCOUNTER — TELEPHONE (OUTPATIENT)
Dept: FAMILY MEDICINE CLINIC | Facility: CLINIC | Age: 36
End: 2021-07-16

## 2021-07-16 NOTE — TELEPHONE ENCOUNTER
Spoke to patient. Has had sinus issues. Tried multiple decongestants OTC. Did COVID test at home (Binax) and was negative.     Looking to get Zpak and Prednisone as those have worked for her in the past.    Informed her that doctors are not in today, thus

## 2021-07-16 NOTE — TELEPHONE ENCOUNTER
PT called stating that she has sinus congestion  Fills her ears filled out  Pt has been taking over the counter decongestant but not helping   Pt is requesting azithromycin (ZITHROMAX Z-ARACELI) 250 MG Oral Tab  Please call back and advise

## 2021-08-16 ENCOUNTER — OFFICE VISIT (OUTPATIENT)
Dept: OBGYN CLINIC | Facility: CLINIC | Age: 36
End: 2021-08-16
Payer: COMMERCIAL

## 2021-08-16 VITALS
HEART RATE: 101 BPM | SYSTOLIC BLOOD PRESSURE: 122 MMHG | WEIGHT: 262 LBS | BODY MASS INDEX: 46 KG/M2 | DIASTOLIC BLOOD PRESSURE: 91 MMHG

## 2021-08-16 DIAGNOSIS — N92.6 IRREGULAR MENSES: Primary | ICD-10-CM

## 2021-08-16 PROCEDURE — 3074F SYST BP LT 130 MM HG: CPT | Performed by: OBSTETRICS & GYNECOLOGY

## 2021-08-16 PROCEDURE — 99212 OFFICE O/P EST SF 10 MIN: CPT | Performed by: OBSTETRICS & GYNECOLOGY

## 2021-08-16 PROCEDURE — 3080F DIAST BP >= 90 MM HG: CPT | Performed by: OBSTETRICS & GYNECOLOGY

## 2021-08-16 NOTE — PROGRESS NOTES
Felix Bryan    1985       Patient presents with:  Gyn Problem: IRREGULAR MENSES  since last November she has had irreg menses and she occasionally skips menses. She has gained at least 15 pounds and her BMI is 46.   We discussed how excess body bring menses down to avoid endometrial hyperplasia.

## 2021-11-10 ENCOUNTER — OFFICE VISIT (OUTPATIENT)
Dept: FAMILY MEDICINE CLINIC | Facility: CLINIC | Age: 36
End: 2021-11-10
Payer: COMMERCIAL

## 2021-11-10 VITALS
BODY MASS INDEX: 46.07 KG/M2 | OXYGEN SATURATION: 96 % | DIASTOLIC BLOOD PRESSURE: 80 MMHG | TEMPERATURE: 99 F | HEIGHT: 63 IN | SYSTOLIC BLOOD PRESSURE: 130 MMHG | WEIGHT: 260 LBS | HEART RATE: 104 BPM

## 2021-11-10 DIAGNOSIS — I10 ESSENTIAL HYPERTENSION: ICD-10-CM

## 2021-11-10 DIAGNOSIS — Z00.00 WELLNESS EXAMINATION: Primary | ICD-10-CM

## 2021-11-10 DIAGNOSIS — G43.829 MENSTRUAL MIGRAINE WITHOUT STATUS MIGRAINOSUS, NOT INTRACTABLE: ICD-10-CM

## 2021-11-10 DIAGNOSIS — Z12.31 ENCOUNTER FOR SCREENING MAMMOGRAM FOR MALIGNANT NEOPLASM OF BREAST: ICD-10-CM

## 2021-11-10 PROCEDURE — 3008F BODY MASS INDEX DOCD: CPT | Performed by: FAMILY MEDICINE

## 2021-11-10 PROCEDURE — 3075F SYST BP GE 130 - 139MM HG: CPT | Performed by: FAMILY MEDICINE

## 2021-11-10 PROCEDURE — 99395 PREV VISIT EST AGE 18-39: CPT | Performed by: FAMILY MEDICINE

## 2021-11-10 PROCEDURE — 99212 OFFICE O/P EST SF 10 MIN: CPT | Performed by: FAMILY MEDICINE

## 2021-11-10 PROCEDURE — 3079F DIAST BP 80-89 MM HG: CPT | Performed by: FAMILY MEDICINE

## 2021-11-10 RX ORDER — LOSARTAN POTASSIUM 25 MG/1
25 TABLET ORAL DAILY
Qty: 90 TABLET | Refills: 1 | Status: SHIPPED | OUTPATIENT
Start: 2021-11-10

## 2021-11-10 RX ORDER — ONDANSETRON 4 MG/1
4 TABLET, ORALLY DISINTEGRATING ORAL EVERY 8 HOURS PRN
Qty: 15 TABLET | Refills: 0 | Status: SHIPPED | OUTPATIENT
Start: 2021-11-10

## 2021-11-10 RX ORDER — SUMATRIPTAN 25 MG/1
25 TABLET, FILM COATED ORAL EVERY 2 HOUR PRN
Qty: 30 TABLET | Refills: 1 | Status: SHIPPED | OUTPATIENT
Start: 2021-11-10

## 2021-11-13 PROBLEM — Z00.00 ANNUAL PHYSICAL EXAM: Status: RESOLVED | Noted: 2018-08-20 | Resolved: 2021-11-13

## 2021-11-13 NOTE — PROGRESS NOTES
CC: Annual Physical Exam    HPI:   Laura Erazo is a 39year old female who presents for a complete physical exam.    HCM  -Startign a wellness program with diet and exercise  -Mental Health: denies any depression or anxiety sx  -Skin care:  no concerning mg total) by mouth every 8 (eight) hours as needed for Nausea. 15 tablet 0   • SUMAtriptan 25 MG Oral Tab Take 1 tablet (25 mg total) by mouth every 2 (two) hours as needed for Migraine (MAX 2 TABLETS PER DAY).  30 tablet 1   • losartan 25 MG Oral Tab Take membrane and external ear normal.   Eyes:      Conjunctiva/sclera: Conjunctivae normal.      Pupils: Pupils are equal, round, and reactive to light. Neck:      Thyroid: No thyromegaly.    Cardiovascular:      Rate and Rhythm: Normal rate and regular rhyth SUMAtriptan 25 MG Oral Tab; Take 1 tablet (25 mg total) by mouth every 2 (two) hours as needed for Migraine (MAX 2 TABLETS PER DAY).          Influenza Vaccine(1) due on 10/01/2021  Annual Physical due on 11/09/2021      The patient indicates understanding

## 2022-03-15 DIAGNOSIS — G43.829 MENSTRUAL MIGRAINE WITHOUT STATUS MIGRAINOSUS, NOT INTRACTABLE: ICD-10-CM

## 2022-03-16 RX ORDER — SUMATRIPTAN 25 MG/1
25 TABLET, FILM COATED ORAL EVERY 2 HOUR PRN
Qty: 30 TABLET | Refills: 0 | Status: SHIPPED | OUTPATIENT
Start: 2022-03-16 | End: 2022-09-03

## 2022-05-17 ENCOUNTER — PATIENT MESSAGE (OUTPATIENT)
Dept: INTERNAL MEDICINE CLINIC | Facility: CLINIC | Age: 37
End: 2022-05-17

## 2022-05-17 NOTE — TELEPHONE ENCOUNTER
From: Shu Rajan  To: Josefina George MD  Sent: 5/17/2022 12:11 AM CDT  Subject: Positive COVID Test    Dear Dr. Dolly Tony:    I am attaching the report for my test to this message as well. I was messaging earlier to request a prescription for Paxlovid since I fall within the high risk category due to [place] obesity and hypertension diagnoses. Thank you!

## 2022-05-18 ENCOUNTER — TELEMEDICINE (OUTPATIENT)
Dept: INTERNAL MEDICINE CLINIC | Facility: CLINIC | Age: 37
End: 2022-05-18

## 2022-05-18 DIAGNOSIS — U07.1 COVID-19: Primary | ICD-10-CM

## 2022-05-18 NOTE — TELEPHONE ENCOUNTER
Yes, we can prescribe it but she needs a virtual appt.  We have 2 SDA's today that she can be added to

## 2022-05-18 NOTE — TELEPHONE ENCOUNTER
MyChart message left for patient re: offer of virtual visit today to be evaluated for oral antiviral medication/ Paxlovid.

## 2022-05-25 ENCOUNTER — OFFICE VISIT (OUTPATIENT)
Dept: OBGYN CLINIC | Facility: CLINIC | Age: 37
End: 2022-05-25
Payer: COMMERCIAL

## 2022-05-25 ENCOUNTER — TELEPHONE (OUTPATIENT)
Dept: INTERNAL MEDICINE CLINIC | Facility: CLINIC | Age: 37
End: 2022-05-25

## 2022-05-25 VITALS
HEART RATE: 106 BPM | SYSTOLIC BLOOD PRESSURE: 132 MMHG | WEIGHT: 258.19 LBS | BODY MASS INDEX: 46 KG/M2 | DIASTOLIC BLOOD PRESSURE: 86 MMHG

## 2022-05-25 DIAGNOSIS — Z11.3 SCREEN FOR STD (SEXUALLY TRANSMITTED DISEASE): Primary | ICD-10-CM

## 2022-05-25 DIAGNOSIS — Z01.419 ENCOUNTER FOR GYNECOLOGICAL EXAMINATION WITHOUT ABNORMAL FINDING: ICD-10-CM

## 2022-05-25 PROCEDURE — 3079F DIAST BP 80-89 MM HG: CPT | Performed by: OBSTETRICS & GYNECOLOGY

## 2022-05-25 PROCEDURE — 99395 PREV VISIT EST AGE 18-39: CPT | Performed by: OBSTETRICS & GYNECOLOGY

## 2022-05-25 PROCEDURE — 3075F SYST BP GE 130 - 139MM HG: CPT | Performed by: OBSTETRICS & GYNECOLOGY

## 2022-05-25 NOTE — TELEPHONE ENCOUNTER
Pt is calling requesting a note for work. She states that since her job is traveling she needs a note to return back to work.       Please call and advise

## 2022-05-25 NOTE — TELEPHONE ENCOUNTER
Pt called back stating that the kind of note she needs its called documentation of recovery letter. Since pt recently tested covid + on 05/16, needs this in order to travel for work purposes on 05/31.   The letter requires a written signature  Pt needs this to be done today or tomorrow  To please inform her when completed so she can come pick it up

## 2022-05-26 LAB
C TRACH DNA SPEC QL NAA+PROBE: NEGATIVE
N GONORRHOEA DNA SPEC QL NAA+PROBE: NEGATIVE

## 2022-05-26 NOTE — TELEPHONE ENCOUNTER
Letter generated and signed, ready for  in grey patient  box. Patient was notified, she will try to  letter either today or tomorrow.

## 2022-05-27 LAB — T VAGINALIS RRNA SPEC QL NAA+PROBE: NEGATIVE

## 2022-09-03 DIAGNOSIS — G43.829 MENSTRUAL MIGRAINE WITHOUT STATUS MIGRAINOSUS, NOT INTRACTABLE: ICD-10-CM

## 2022-09-06 RX ORDER — SUMATRIPTAN 25 MG/1
25 TABLET, FILM COATED ORAL EVERY 2 HOUR PRN
Qty: 30 TABLET | Refills: 0 | Status: SHIPPED | OUTPATIENT
Start: 2022-09-06

## 2022-10-12 ENCOUNTER — OFFICE VISIT (OUTPATIENT)
Dept: INTERNAL MEDICINE CLINIC | Facility: CLINIC | Age: 37
End: 2022-10-12
Payer: COMMERCIAL

## 2022-10-12 VITALS
DIASTOLIC BLOOD PRESSURE: 74 MMHG | TEMPERATURE: 98 F | BODY MASS INDEX: 45 KG/M2 | HEART RATE: 98 BPM | WEIGHT: 254 LBS | SYSTOLIC BLOOD PRESSURE: 112 MMHG | OXYGEN SATURATION: 100 %

## 2022-10-12 DIAGNOSIS — G43.829 MENSTRUAL MIGRAINE WITHOUT STATUS MIGRAINOSUS, NOT INTRACTABLE: ICD-10-CM

## 2022-10-12 DIAGNOSIS — I10 ESSENTIAL HYPERTENSION: ICD-10-CM

## 2022-10-12 PROCEDURE — 99214 OFFICE O/P EST MOD 30 MIN: CPT | Performed by: FAMILY MEDICINE

## 2022-10-12 PROCEDURE — 3078F DIAST BP <80 MM HG: CPT | Performed by: FAMILY MEDICINE

## 2022-10-12 PROCEDURE — 3074F SYST BP LT 130 MM HG: CPT | Performed by: FAMILY MEDICINE

## 2022-10-12 RX ORDER — LOSARTAN POTASSIUM 25 MG/1
25 TABLET ORAL DAILY
Qty: 90 TABLET | Refills: 1 | Status: SHIPPED | OUTPATIENT
Start: 2022-10-12

## 2022-10-12 RX ORDER — SUMATRIPTAN 25 MG/1
25 TABLET, FILM COATED ORAL EVERY 2 HOUR PRN
Qty: 30 TABLET | Refills: 0 | Status: SHIPPED | OUTPATIENT
Start: 2022-10-12

## 2022-12-14 DIAGNOSIS — G43.829 MENSTRUAL MIGRAINE WITHOUT STATUS MIGRAINOSUS, NOT INTRACTABLE: ICD-10-CM

## 2022-12-16 RX ORDER — SUMATRIPTAN 25 MG/1
25 TABLET, FILM COATED ORAL EVERY 2 HOUR PRN
Qty: 30 TABLET | Refills: 0 | Status: SHIPPED | OUTPATIENT
Start: 2022-12-16

## 2022-12-16 RX ORDER — ONDANSETRON 4 MG/1
4 TABLET, ORALLY DISINTEGRATING ORAL EVERY 8 HOURS PRN
Qty: 15 TABLET | Refills: 0 | Status: SHIPPED | OUTPATIENT
Start: 2022-12-16

## 2022-12-16 NOTE — TELEPHONE ENCOUNTER
Pt called requesting refills for her medications,   See previous message request    Pt is going out of town Dec 20th and will need this refilled before    Please inform pt when this has been sent

## 2023-01-23 DIAGNOSIS — G43.829 MENSTRUAL MIGRAINE WITHOUT STATUS MIGRAINOSUS, NOT INTRACTABLE: ICD-10-CM

## 2023-01-24 RX ORDER — SUMATRIPTAN 25 MG/1
25 TABLET, FILM COATED ORAL EVERY 2 HOUR PRN
Qty: 30 TABLET | Refills: 0 | Status: SHIPPED | OUTPATIENT
Start: 2023-01-24

## 2023-03-31 ENCOUNTER — OFFICE VISIT (OUTPATIENT)
Dept: INTERNAL MEDICINE CLINIC | Facility: CLINIC | Age: 38
End: 2023-03-31
Payer: COMMERCIAL

## 2023-03-31 ENCOUNTER — LAB ENCOUNTER (OUTPATIENT)
Dept: LAB | Facility: HOSPITAL | Age: 38
End: 2023-03-31
Attending: FAMILY MEDICINE
Payer: COMMERCIAL

## 2023-03-31 VITALS
DIASTOLIC BLOOD PRESSURE: 84 MMHG | HEART RATE: 69 BPM | WEIGHT: 261 LBS | SYSTOLIC BLOOD PRESSURE: 122 MMHG | OXYGEN SATURATION: 98 % | HEIGHT: 63 IN | TEMPERATURE: 98 F | BODY MASS INDEX: 46.25 KG/M2

## 2023-03-31 DIAGNOSIS — Z00.00 WELLNESS EXAMINATION: Primary | ICD-10-CM

## 2023-03-31 DIAGNOSIS — Z12.31 ENCOUNTER FOR SCREENING MAMMOGRAM FOR MALIGNANT NEOPLASM OF BREAST: ICD-10-CM

## 2023-03-31 DIAGNOSIS — Z00.00 WELLNESS EXAMINATION: ICD-10-CM

## 2023-03-31 DIAGNOSIS — G43.829 MENSTRUAL MIGRAINE WITHOUT STATUS MIGRAINOSUS, NOT INTRACTABLE: ICD-10-CM

## 2023-03-31 DIAGNOSIS — Z23 NEED FOR VACCINATION: ICD-10-CM

## 2023-03-31 DIAGNOSIS — I10 ESSENTIAL HYPERTENSION: ICD-10-CM

## 2023-03-31 LAB
ALBUMIN SERPL-MCNC: 3.5 G/DL (ref 3.4–5)
ALBUMIN/GLOB SERPL: 0.8 {RATIO} (ref 1–2)
ALP LIVER SERPL-CCNC: 85 U/L
ALT SERPL-CCNC: 23 U/L
ANION GAP SERPL CALC-SCNC: 4 MMOL/L (ref 0–18)
AST SERPL-CCNC: 18 U/L (ref 15–37)
BASOPHILS # BLD AUTO: 0.03 X10(3) UL (ref 0–0.2)
BASOPHILS NFR BLD AUTO: 0.5 %
BILIRUB SERPL-MCNC: 0.4 MG/DL (ref 0.1–2)
BUN BLD-MCNC: 12 MG/DL (ref 7–18)
BUN/CREAT SERPL: 12.9 (ref 10–20)
CALCIUM BLD-MCNC: 9.5 MG/DL (ref 8.5–10.1)
CHLORIDE SERPL-SCNC: 108 MMOL/L (ref 98–112)
CHOLEST SERPL-MCNC: 176 MG/DL (ref ?–200)
CO2 SERPL-SCNC: 29 MMOL/L (ref 21–32)
CREAT BLD-MCNC: 0.93 MG/DL
DEPRECATED RDW RBC AUTO: 47.1 FL (ref 35.1–46.3)
EOSINOPHIL # BLD AUTO: 0.11 X10(3) UL (ref 0–0.7)
EOSINOPHIL NFR BLD AUTO: 1.8 %
ERYTHROCYTE [DISTWIDTH] IN BLOOD BY AUTOMATED COUNT: 15.7 % (ref 11–15)
EST. AVERAGE GLUCOSE BLD GHB EST-MCNC: 120 MG/DL (ref 68–126)
FASTING PATIENT LIPID ANSWER: YES
FASTING STATUS PATIENT QL REPORTED: YES
GFR SERPLBLD BASED ON 1.73 SQ M-ARVRAT: 81 ML/MIN/1.73M2 (ref 60–?)
GLOBULIN PLAS-MCNC: 4.2 G/DL (ref 2.8–4.4)
GLUCOSE BLD-MCNC: 91 MG/DL (ref 70–99)
HBA1C MFR BLD: 5.8 % (ref ?–5.7)
HCT VFR BLD AUTO: 43.6 %
HDLC SERPL-MCNC: 90 MG/DL (ref 40–59)
HGB BLD-MCNC: 14.1 G/DL
IMM GRANULOCYTES # BLD AUTO: 0.01 X10(3) UL (ref 0–1)
IMM GRANULOCYTES NFR BLD: 0.2 %
LDLC SERPL CALC-MCNC: 76 MG/DL (ref ?–100)
LYMPHOCYTES # BLD AUTO: 2.04 X10(3) UL (ref 1–4)
LYMPHOCYTES NFR BLD AUTO: 33.5 %
MCH RBC QN AUTO: 27 PG (ref 26–34)
MCHC RBC AUTO-ENTMCNC: 32.3 G/DL (ref 31–37)
MCV RBC AUTO: 83.4 FL
MONOCYTES # BLD AUTO: 0.56 X10(3) UL (ref 0.1–1)
MONOCYTES NFR BLD AUTO: 9.2 %
NEUTROPHILS # BLD AUTO: 3.34 X10 (3) UL (ref 1.5–7.7)
NEUTROPHILS # BLD AUTO: 3.34 X10(3) UL (ref 1.5–7.7)
NEUTROPHILS NFR BLD AUTO: 54.8 %
NONHDLC SERPL-MCNC: 86 MG/DL (ref ?–130)
OSMOLALITY SERPL CALC.SUM OF ELEC: 291 MOSM/KG (ref 275–295)
PLATELET # BLD AUTO: 337 10(3)UL (ref 150–450)
POTASSIUM SERPL-SCNC: 4.5 MMOL/L (ref 3.5–5.1)
PROT SERPL-MCNC: 7.7 G/DL (ref 6.4–8.2)
RBC # BLD AUTO: 5.23 X10(6)UL
SODIUM SERPL-SCNC: 141 MMOL/L (ref 136–145)
TRIGL SERPL-MCNC: 52 MG/DL (ref 30–149)
TSI SER-ACNC: 1.19 MIU/ML (ref 0.36–3.74)
VLDLC SERPL CALC-MCNC: 8 MG/DL (ref 0–30)
WBC # BLD AUTO: 6.1 X10(3) UL (ref 4–11)

## 2023-03-31 PROCEDURE — 80061 LIPID PANEL: CPT | Performed by: FAMILY MEDICINE

## 2023-03-31 PROCEDURE — 99395 PREV VISIT EST AGE 18-39: CPT | Performed by: FAMILY MEDICINE

## 2023-03-31 PROCEDURE — 80050 GENERAL HEALTH PANEL: CPT | Performed by: FAMILY MEDICINE

## 2023-03-31 PROCEDURE — 85025 COMPLETE CBC W/AUTO DIFF WBC: CPT | Performed by: FAMILY MEDICINE

## 2023-03-31 PROCEDURE — 3079F DIAST BP 80-89 MM HG: CPT | Performed by: FAMILY MEDICINE

## 2023-03-31 PROCEDURE — 99212 OFFICE O/P EST SF 10 MIN: CPT | Performed by: FAMILY MEDICINE

## 2023-03-31 PROCEDURE — 3008F BODY MASS INDEX DOCD: CPT | Performed by: FAMILY MEDICINE

## 2023-03-31 PROCEDURE — 3074F SYST BP LT 130 MM HG: CPT | Performed by: FAMILY MEDICINE

## 2023-03-31 PROCEDURE — 90715 TDAP VACCINE 7 YRS/> IM: CPT | Performed by: FAMILY MEDICINE

## 2023-03-31 PROCEDURE — 83036 HEMOGLOBIN GLYCOSYLATED A1C: CPT | Performed by: FAMILY MEDICINE

## 2023-03-31 RX ORDER — SUMATRIPTAN 25 MG/1
25 TABLET, FILM COATED ORAL EVERY 2 HOUR PRN
Qty: 30 TABLET | Refills: 0 | Status: SHIPPED | OUTPATIENT
Start: 2023-03-31

## 2023-03-31 RX ORDER — LOSARTAN POTASSIUM 25 MG/1
25 TABLET ORAL DAILY
Qty: 90 TABLET | Refills: 1 | Status: SHIPPED | OUTPATIENT
Start: 2023-03-31

## 2023-05-25 ENCOUNTER — OFFICE VISIT (OUTPATIENT)
Dept: OBGYN CLINIC | Facility: CLINIC | Age: 38
End: 2023-05-25

## 2023-05-25 ENCOUNTER — LAB ENCOUNTER (OUTPATIENT)
Dept: LAB | Facility: HOSPITAL | Age: 38
End: 2023-05-25
Attending: OBSTETRICS & GYNECOLOGY
Payer: COMMERCIAL

## 2023-05-25 VITALS
HEART RATE: 86 BPM | DIASTOLIC BLOOD PRESSURE: 89 MMHG | SYSTOLIC BLOOD PRESSURE: 134 MMHG | WEIGHT: 263 LBS | HEIGHT: 63 IN | BODY MASS INDEX: 46.6 KG/M2

## 2023-05-25 DIAGNOSIS — Z11.3 SCREEN FOR STD (SEXUALLY TRANSMITTED DISEASE): ICD-10-CM

## 2023-05-25 DIAGNOSIS — D21.9 FIBROIDS: ICD-10-CM

## 2023-05-25 DIAGNOSIS — Z01.419 ENCOUNTER FOR GYNECOLOGICAL EXAMINATION WITHOUT ABNORMAL FINDING: Primary | ICD-10-CM

## 2023-05-25 DIAGNOSIS — Z31.69 PROCREATIVE MANAGEMENT COUNSELING: ICD-10-CM

## 2023-05-25 PROCEDURE — 86780 TREPONEMA PALLIDUM: CPT

## 2023-05-25 PROCEDURE — 36415 COLL VENOUS BLD VENIPUNCTURE: CPT

## 2023-05-25 PROCEDURE — 3075F SYST BP GE 130 - 139MM HG: CPT | Performed by: OBSTETRICS & GYNECOLOGY

## 2023-05-25 PROCEDURE — 3008F BODY MASS INDEX DOCD: CPT | Performed by: OBSTETRICS & GYNECOLOGY

## 2023-05-25 PROCEDURE — 99213 OFFICE O/P EST LOW 20 MIN: CPT | Performed by: OBSTETRICS & GYNECOLOGY

## 2023-05-25 PROCEDURE — 3079F DIAST BP 80-89 MM HG: CPT | Performed by: OBSTETRICS & GYNECOLOGY

## 2023-05-25 PROCEDURE — 99395 PREV VISIT EST AGE 18-39: CPT | Performed by: OBSTETRICS & GYNECOLOGY

## 2023-05-25 PROCEDURE — 87389 HIV-1 AG W/HIV-1&-2 AB AG IA: CPT

## 2023-05-26 LAB
C TRACH DNA SPEC QL NAA+PROBE: NEGATIVE
HPV I/H RISK 1 DNA SPEC QL NAA+PROBE: NEGATIVE
N GONORRHOEA DNA SPEC QL NAA+PROBE: NEGATIVE
T PALLIDUM AB SER QL: NEGATIVE
T VAGINALIS RRNA SPEC QL NAA+PROBE: NEGATIVE

## 2023-05-31 LAB — LAST PAP RESULT: NORMAL

## 2023-06-07 DIAGNOSIS — G43.829 MENSTRUAL MIGRAINE WITHOUT STATUS MIGRAINOSUS, NOT INTRACTABLE: ICD-10-CM

## 2023-06-09 RX ORDER — SUMATRIPTAN 25 MG/1
25 TABLET, FILM COATED ORAL EVERY 2 HOUR PRN
Qty: 30 TABLET | Refills: 0 | Status: SHIPPED | OUTPATIENT
Start: 2023-06-09

## 2023-06-30 ENCOUNTER — HOSPITAL ENCOUNTER (OUTPATIENT)
Dept: ULTRASOUND IMAGING | Facility: HOSPITAL | Age: 38
Discharge: HOME | End: 2023-06-30
Attending: OBSTETRICS & GYNECOLOGY
Payer: COMMERCIAL

## 2023-06-30 DIAGNOSIS — D21.9 FIBROIDS: ICD-10-CM

## 2023-06-30 PROCEDURE — 76856 US EXAM PELVIC COMPLETE: CPT | Performed by: OBSTETRICS & GYNECOLOGY

## 2023-06-30 PROCEDURE — 76830 TRANSVAGINAL US NON-OB: CPT | Performed by: OBSTETRICS & GYNECOLOGY

## 2023-08-09 DIAGNOSIS — G43.829 MENSTRUAL MIGRAINE WITHOUT STATUS MIGRAINOSUS, NOT INTRACTABLE: ICD-10-CM

## 2023-08-09 RX ORDER — SUMATRIPTAN 25 MG/1
25 TABLET, FILM COATED ORAL EVERY 2 HOUR PRN
Qty: 30 TABLET | Refills: 0 | Status: SHIPPED | OUTPATIENT
Start: 2023-08-09

## 2023-09-22 ENCOUNTER — NURSE TRIAGE (OUTPATIENT)
Dept: INTERNAL MEDICINE CLINIC | Facility: CLINIC | Age: 38
End: 2023-09-22

## 2023-09-22 NOTE — TELEPHONE ENCOUNTER
Pt called stating that she went to  yesterday at [facility] and was told she has a sinus infection  Pt stated that she was only prescribed a nasal spray but no antibiotics or any other medication  Pt is getting worst, with a lot of headaches and pressure, congestion that is unable to clear and green thick mucus     Pt asked if possible for pcp to prescribe medication    Please advise

## 2023-09-22 NOTE — TELEPHONE ENCOUNTER
Spoke with patient she stats on 9/18 she started with cough and headache. She was at [facility] yesterday. Covid Melly Hew Marino Carter negative. Her right ear has fluid build up with fullness more than left ear. Coughing up green mucus. Today she is feeling worse. Head pressure on temples/ 8/10 pain. Sumatriptan did not help. Tylenol is not working. Ibuprofen does not work due to stomach irritation. Future Appointments   Date Time Provider [place]   9/29/2023 10:20 AM Tessa Shore MD [facility] [place]   12/18/2023  1:40 PM Tessa Shore MD [facility] [place]      Reason for Disposition   SEVERE headache and not relieved by pain meds    Answer Assessment - Initial Assessment Questions  1. LOCATION: \"Where does it hurt? \"       Temples   2. ONSET: \"When did the headache start? \" (Minutes, hours or days)      yesterday  3. PATTERN: \"Does the pain come and go, or has it been constant since it started? \"     constant  4. SEVERITY: \"How bad is the pain? \" and \"What does it keep you from doing? \"  (e.g., Scale 1-10; mild, moderate, or severe)    - MILD (1-3): doesn't interfere with normal activities     - MODERATE (4-7): interferes with normal activities or awakens from sleep     - SEVERE (8-10): excruciating pain, unable to do any normal activities         8/10  5. RECURRENT SYMPTOM: \"Have you ever had headaches before? \" If Yes, ask: \"When was the last time? \" and \"What happened that time? \"       2 weeks ago was migrains  6. CAUSE: \"What do you think is causing the headache? \"      Sinus  7. MIGRAINE: \"Have you been diagnosed with migraine headaches? \" If Yes, ask: \"Is this headache similar? \"       Yes/ not similar  8. HEAD INJURY: \"Has there been any recent injury to the head? \"       No   9. OTHER SYMPTOMS: \"Do you have any other symptoms? \" (fever, stiff neck, eye pain, sore throat, cold symptoms)      Cough  10. PREGNANCY: \"Is there any chance you are pregnant? \" \"When was your last menstrual period? \" Last menstrual 9/22/23    Protocols used: Headache-A-OH

## 2023-09-23 NOTE — TELEPHONE ENCOUNTER
This should be switched to an in-office visit instead. If she wishes to obtain txt prior, she can also try going to our UC/WIC.

## 2023-09-25 NOTE — TELEPHONE ENCOUNTER
Attempted to contact patient. Unable to reach. Message left on Voicemail that patient wants her to be seen in person.

## 2023-10-05 DIAGNOSIS — G43.829 MENSTRUAL MIGRAINE WITHOUT STATUS MIGRAINOSUS, NOT INTRACTABLE: ICD-10-CM

## 2023-10-06 RX ORDER — SUMATRIPTAN 25 MG/1
25 TABLET, FILM COATED ORAL EVERY 2 HOUR PRN
Qty: 30 TABLET | Refills: 0 | Status: SHIPPED | OUTPATIENT
Start: 2023-10-06

## 2023-10-23 ENCOUNTER — TELEMEDICINE (OUTPATIENT)
Dept: INTERNAL MEDICINE CLINIC | Facility: CLINIC | Age: 38
End: 2023-10-23
Payer: COMMERCIAL

## 2023-10-23 DIAGNOSIS — J40 BRONCHITIS: Primary | ICD-10-CM

## 2023-10-23 RX ORDER — ALBUTEROL SULFATE 90 UG/1
1-2 AEROSOL, METERED RESPIRATORY (INHALATION) EVERY 6 HOURS PRN
Qty: 1 EACH | Refills: 0 | Status: SHIPPED | OUTPATIENT
Start: 2023-10-23

## 2023-10-23 NOTE — PROGRESS NOTES
This is a telemedicine visit with live, interactive video and audio. Patient understands and accepts financial responsibility for any deductible, co-insurance and/or co-pays associated with this service. SUBJECTIVE    1mo ago went to [facility] for URI sx and was negative for flu/Strep/COVID. Given tessalon perles, flonase. No improvemetn thus returned to  and given Abx (Doxycycline x7d). Still having coughing spasms, congestion   Taking zyrtec, flonase  No SOB/CP, fevers  O2 sat monitor at home with levels in >98%    HISTORY:  No past medical history on file. No past surgical history on file. Family History   Problem Relation Age of Onset    Hypertension Father     Hypertension Mother     Diabetes Mother     Cancer Neg       Social History     Socioeconomic History    Marital status: Single   Tobacco Use    Smoking status: Never    Smokeless tobacco: Never   Substance and Sexual Activity    Alcohol use: Yes     Alcohol/week: 0.0 standard drinks of alcohol     Comment: occa    Drug use: No    Sexual activity: Yes     Birth control/protection: Condom          Lisinopril              Coughing   Current Outpatient Medications   Medication Sig Dispense Refill    albuterol 108 (90 Base) MCG/ACT Inhalation Aero Soln Inhale 1-2 puffs into the lungs every 6 (six) hours as needed for Wheezing. 1 each 0    guaiFENesin-Codeine 200-10 MG/5ML Oral Liquid Take 10 mL by mouth every 6 (six) hours as needed (cough). 118 mL 0    SUMAtriptan 25 MG Oral Tab Take 1 tablet (25 mg total) by mouth every 2 (two) hours as needed for Migraine (MAX 2 TABLETS PER DAY). 30 tablet 0    losartan 25 MG Oral Tab Take 1 tablet (25 mg total) by mouth daily. 90 tablet 1    ondansetron 4 MG Oral Tablet Dispersible Take 1 tablet (4 mg total) by mouth every 8 (eight) hours as needed for Nausea.  15 tablet 0       OBJECTIVE  Physical Exam:   Appears AxOx3, no increased work of breathing, speaking in full sentences      ASSESSMENT & PLAN  Diagnoses and all orders for this visit:    Bronchitis  -     albuterol 108 (90 Base) MCG/ACT Inhalation Aero Soln; Inhale 1-2 puffs into the lungs every 6 (six) hours as needed for Wheezing.  -     guaiFENesin-Codeine 200-10 MG/5ML Oral Liquid; Take 10 mL by mouth every 6 (six) hours as needed (cough). -supportive care PRN  -symptomatic x1mo; s/p Abx   -trial of above  -if no improvement will obtain CXR      Return if symptoms worsen or fail to improve. Reasurrance and education provided. All questions answered. Red flags/ ER precautions discussed. The patient understands and accepts the limitations of the use of telemedicine. Please note that the following visit was completed using two-way, real-time interactive audio and/or video communication. This has been done in good amarilys to provide continuity of care in the best interest of the provider-patient relationship, due to the ongoing public health crisis/national emergency and because of restrictions of visitation. There are limitations of this visit as no physical exam could be performed. Every conscious effort was taken to allow for sufficient and adequate time. Spent 20 minutes including chart review, reviewing appropriate medical history, evaluating patient, discussing treatment options, counseling and education, ordering meds or tests and completing documentation.

## 2023-12-12 DIAGNOSIS — G43.829 MENSTRUAL MIGRAINE WITHOUT STATUS MIGRAINOSUS, NOT INTRACTABLE: ICD-10-CM

## 2023-12-12 RX ORDER — SUMATRIPTAN 25 MG/1
25 TABLET, FILM COATED ORAL EVERY 2 HOUR PRN
Qty: 30 TABLET | Refills: 0 | Status: SHIPPED | OUTPATIENT
Start: 2023-12-12

## 2023-12-26 ENCOUNTER — TELEPHONE (OUTPATIENT)
Dept: INTERNAL MEDICINE CLINIC | Facility: CLINIC | Age: 38
End: 2023-12-26

## 2023-12-26 NOTE — TELEPHONE ENCOUNTER
Responded to IMANIN message / communication from patient requesting work absence/ excuse note for time off work fron Dr Shahram Jensen which Dr Shahram Jensen approved however no letter noted in EPIC to date. F/U     IMANIN message sent to patient inquiring if she still needs a letter bc the quoted RTW request was to have been for 12/17/23.

## 2024-02-04 DIAGNOSIS — G43.829 MENSTRUAL MIGRAINE WITHOUT STATUS MIGRAINOSUS, NOT INTRACTABLE: ICD-10-CM

## 2024-02-05 RX ORDER — SUMATRIPTAN 25 MG/1
25 TABLET, FILM COATED ORAL EVERY 2 HOUR PRN
Qty: 30 TABLET | Refills: 0 | Status: SHIPPED | OUTPATIENT
Start: 2024-02-05

## 2024-05-06 DIAGNOSIS — G43.829 MENSTRUAL MIGRAINE WITHOUT STATUS MIGRAINOSUS, NOT INTRACTABLE: ICD-10-CM

## 2024-05-06 RX ORDER — SUMATRIPTAN 25 MG/1
25 TABLET, FILM COATED ORAL EVERY 2 HOUR PRN
Qty: 30 TABLET | Refills: 0 | Status: SHIPPED | OUTPATIENT
Start: 2024-05-06

## 2024-05-06 NOTE — TELEPHONE ENCOUNTER
Future Appt. 07/24/2024     Last Visit: 10/23/2023    Medication Requested:  Requested Prescriptions     Pending Prescriptions Disp Refills    SUMAtriptan 25 MG Oral Tab 30 tablet 0     Sig: Take 1 tablet (25 mg total) by mouth every 2 (two) hours as needed for Migraine (MAX 2 TABLETS PER DAY).        Last refill:      Disp Refills Start End     SUMAtriptan 25 MG Oral Tab 30 tablet 0 2/5/2024 --    Sig - Route: Take 1 tablet (25 mg total) by mouth every 2 (two) hours as needed for Migraine (MAX 2 TABLETS PER DAY). - Oral      Neurology Medications [id number]/06/2024 09:45 AM   Protocol Details In person appointment or virtual visit in the past 6 mos or appointment in next 3 mos

## 2024-05-13 ENCOUNTER — TELEPHONE (OUTPATIENT)
Dept: INTERNAL MEDICINE CLINIC | Facility: CLINIC | Age: 39
End: 2024-05-13

## 2024-05-13 NOTE — TELEPHONE ENCOUNTER
Spoke with [facility] inquiry if sumatriptan needs prior  authorization. RN was informed there was no information other than it is too soon for a refill.

## 2024-07-03 ENCOUNTER — LAB ENCOUNTER (OUTPATIENT)
Dept: LAB | Facility: HOSPITAL | Age: 39
End: 2024-07-03
Attending: OBSTETRICS & GYNECOLOGY
Payer: COMMERCIAL

## 2024-07-03 ENCOUNTER — TELEPHONE (OUTPATIENT)
Dept: OBGYN CLINIC | Facility: CLINIC | Age: 39
End: 2024-07-03

## 2024-07-03 ENCOUNTER — OFFICE VISIT (OUTPATIENT)
Dept: OBGYN CLINIC | Facility: CLINIC | Age: 39
End: 2024-07-03
Payer: COMMERCIAL

## 2024-07-03 VITALS
WEIGHT: 285.19 LBS | HEART RATE: 98 BPM | BODY MASS INDEX: 50.53 KG/M2 | SYSTOLIC BLOOD PRESSURE: 143 MMHG | HEIGHT: 63 IN | DIASTOLIC BLOOD PRESSURE: 96 MMHG

## 2024-07-03 DIAGNOSIS — N91.2 ABSENT MENSES: ICD-10-CM

## 2024-07-03 DIAGNOSIS — Z11.3 SCREENING FOR STD (SEXUALLY TRANSMITTED DISEASE): Primary | ICD-10-CM

## 2024-07-03 DIAGNOSIS — Z11.3 SCREENING FOR STD (SEXUALLY TRANSMITTED DISEASE): ICD-10-CM

## 2024-07-03 DIAGNOSIS — Z01.411 ENCOUNTER FOR GYNECOLOGICAL EXAMINATION WITH ABNORMAL FINDING: ICD-10-CM

## 2024-07-03 DIAGNOSIS — N91.2 ABSENT MENSES: Primary | ICD-10-CM

## 2024-07-03 LAB
HCG SERPL QL: NEGATIVE
T PALLIDUM AB SER QL IA: NONREACTIVE

## 2024-07-03 PROCEDURE — 86780 TREPONEMA PALLIDUM: CPT

## 2024-07-03 PROCEDURE — 87389 HIV-1 AG W/HIV-1&-2 AB AG IA: CPT

## 2024-07-03 PROCEDURE — 99213 OFFICE O/P EST LOW 20 MIN: CPT | Performed by: OBSTETRICS & GYNECOLOGY

## 2024-07-03 PROCEDURE — 84703 CHORIONIC GONADOTROPIN ASSAY: CPT

## 2024-07-03 PROCEDURE — 36415 COLL VENOUS BLD VENIPUNCTURE: CPT

## 2024-07-03 PROCEDURE — 3077F SYST BP >= 140 MM HG: CPT | Performed by: OBSTETRICS & GYNECOLOGY

## 2024-07-03 PROCEDURE — 3080F DIAST BP >= 90 MM HG: CPT | Performed by: OBSTETRICS & GYNECOLOGY

## 2024-07-03 PROCEDURE — 3008F BODY MASS INDEX DOCD: CPT | Performed by: OBSTETRICS & GYNECOLOGY

## 2024-07-03 RX ORDER — MEDROXYPROGESTERONE ACETATE 10 MG/1
10 TABLET ORAL DAILY
Qty: 10 TABLET | Refills: 0 | Status: SHIPPED | OUTPATIENT
Start: 2024-07-03

## 2024-07-03 NOTE — TELEPHONE ENCOUNTER
Patient called in regarding a test that was ordered the Beta HCG it was (Qualitative) patient request to have  a Beta HCG (Quantitative).  Patient request for a nurse to call to confirm

## 2024-07-03 NOTE — PROGRESS NOTES
Felix Bryan is a 39 year old female  Patient's last menstrual period was 2024 (approximate).    Chief Complaint   Patient presents with    Gyn Exam     Annual    .     She has not had menses since April- no new meds but she has gained at least 35# since last year- admits her eating habits have been poor.  We discussed diet and weight loss and exercise.  Recommended weight management clinic.  We discussed how high BMI can affect hormonal balance and ovulation and that this can be a risk factor for endometrial cancer.  I rec provera withdrawal q 3 months if no spont menses.  She had negative urine pregnancy test 2 weeks ago.  15 min spent in this discussion.    OBSTETRICS HISTORY:  OB History    Para Term  AB Living   0 0 0 0 0 0   SAB IAB Ectopic Multiple Live Births   0 0 0 0 0       GYNE HISTORY:   Menarche: 8 years old  Hx Prior Abnormal Pap: No  Pap Date: 23  Pap Result Notes: Neg Pap/HPV  Follow Up Recommendation: Annual 23 CAP      MEDICAL HISTORY:  History reviewed. No pertinent past medical history.  History reviewed. No pertinent surgical history.      SOCIAL HISTORY:  Social History     Socioeconomic History    Marital status: Single   Tobacco Use    Smoking status: Never    Smokeless tobacco: Never   Substance and Sexual Activity    Alcohol use: Not Currently     Comment: occa    Drug use: No    Sexual activity: Yes        FAMILY HISTORY:  Family History   Problem Relation Age of Onset    Hypertension Father     Hypertension Mother     Diabetes Mother     Cancer Neg        MEDICATIONS:    Current Outpatient Medications:     medroxyPROGESTERone Acetate (PROVERA) 10 MG Oral Tab, Take 1 tablet (10 mg total) by mouth daily., Disp: 10 tablet, Rfl: 0    SUMAtriptan 25 MG Oral Tab, Take 1 tablet (25 mg total) by mouth every 2 (two) hours as needed for Migraine (MAX 2 TABLETS PER DAY)., Disp: 30 tablet, Rfl: 0    albuterol 108 (90 Base) MCG/ACT Inhalation Aero Soln, Inhale  1-2 puffs into the lungs every 6 (six) hours as needed for Wheezing., Disp: 1 each, Rfl: 0    losartan 25 MG Oral Tab, Take 1 tablet (25 mg total) by mouth daily., Disp: 90 tablet, Rfl: 1    ondansetron 4 MG Oral Tablet Dispersible, Take 1 tablet (4 mg total) by mouth every 8 (eight) hours as needed for Nausea., Disp: 15 tablet, Rfl: 0    guaiFENesin-Codeine 200-10 MG/5ML Oral Liquid, Take 10 mL by mouth every 6 (six) hours as needed (cough). (Patient not taking: Reported on 7/3/2024), Disp: 118 mL, Rfl: 0    ALLERGIES:    Allergies   Allergen Reactions    Lisinopril Coughing       Blood pressure (!) 143/96, pulse 98, height 5' 3\" (1.6 m), weight 285 lb 3.2 oz (129.4 kg), last menstrual period 04/09/2024, not currently breastfeeding.    Review of Systems:  Constitutional:  Denies fatigue, night sweats, hot flashes  Eyes:  denies blurred or double vision  Cardiovascular:  denies chest pain or palpitations  Respiratory:  denies shortness of breath  Gastrointestinal:  denies heartburn, abdominal pain, diarrhea or constipation  Genitourinary:  denies dysuria, incontinence, abnormal vaginal discharge, vaginal itching  Musculoskeletal:  denies back pain.  Skin/Breast:  Denies any breast pain, lumps, or discharge.   Neurological:  denies headaches, extremity weakness or numbness.  Psychiatric: denies depression or anxiety.  Endocrine:   denies excessive thirst or urination.  Heme/Lymph:  denies history of anemia, easy bruising or bleeding.    Depression Screening (PHQ-2/PHQ-9): Over the LAST 2 WEEKS   Little interest or pleasure in doing things: Not at all    Feeling down, depressed, or hopeless: Not at all    PHQ-2 SCORE: 0           PHYSICAL EXAM:   Constitutional: well developed, well nourished  Head/Face: normocephalic  Neck/Thyroid: thyroid symmetric, no thyromegaly, no nodules, no adenopathy  Lymphatic:no abnormal supraclavicular or axillary adenopathy is noted  Breast: normal without palpable masses, tenderness,  asymmetry, nipple discharge, nipple retraction or skin changes  Respiratory:  lungs clear to auscultation bilaterally  Cardiovascular: regular rate and rhythm, no significant murmur  Abdomen:  soft, nontender, nondistended, no masses  Skin/Hair: no unusual rashes or bruises  Extremities: no edema, no cyanosis  Psychiatric:  Oriented to time, place, person and situation. Appropriate mood and affect    Pelvic Exam:  External Genitalia: normal appearance, hair distribution, and no lesions  Urethral Meatus:  normal in size, location, without lesions and prolapse  Bladder:  No fullness, masses or tenderness  Vagina:  Normal appearance without lesions, no abnormal discharge  Cervix:  Normal without tenderness on motion  Uterus: normal in size, contour, position, mobility, without tenderness  Adnexa: normal without masses or tenderness  Perineum: normal  Anus: no hemorroids     Assessment & Plan:    Encounter Diagnoses   Name Primary?    Encounter for gynecological examination with abnormal finding     Absent menses     BMI 50.0-59.9, adult (HCC)     Screening for STD (sexually transmitted disease) Yes     ASCCP guidelines discussed,cotest done, [id number] in 3 yearsrtc 1 year for annual exam   SBE encouraged  Orders Placed This Encounter   Procedures    Trichomonas vaginalis, ALYSON (Vaginal/Cervical)    HCG, Beta Subunit, Qual    T Pallidum Screening [place]    HIV Ag/Ab Combo    Chlamydia/Gc Amplification       Requested Prescriptions     Signed Prescriptions Disp Refills    medroxyPROGESTERone Acetate (PROVERA) 10 MG Oral Tab 10 tablet 0     Sig: Take 1 tablet (10 mg total) by mouth daily.       None

## 2024-07-03 NOTE — TELEPHONE ENCOUNTER
Patient seen in office today for absent menses since April.     HCG qual came back negative.    Patient works in lab. She is wondering if we can convert to quant for peace of mind.   She states the quant is a different test method. She is trying to conceive, so really does not want to start Provera unless we can confirm negative with quant.     Advised Dr. Dixon out of office until Friday, so would have to wait for her response. Patient is okay to wait.     To Dr. iDxon- ok for quant?

## 2024-07-04 LAB
C TRACH DNA SPEC QL NAA+PROBE: NEGATIVE
N GONORRHOEA DNA SPEC QL NAA+PROBE: NEGATIVE

## 2024-07-05 NOTE — TELEPHONE ENCOUNTER
Patient informed of Dr. Dixon's response.  Patient states she will  the provera to start taking it.  RN asked patient if she has had unprotected intercourse since doing the blood work.  Patient states she did.  Reviewed this with Dr. Dixon.  Per Dr. Dixon, wait one week then do another qual.  If negative patient to start provera.  Patient informed.  Order placed.

## 2024-07-05 NOTE — TELEPHONE ENCOUNTER
If the qual is negative the quant will be too.  It is not necessary to convert to quant and not appropriate at this time.  It will also be an unnecessary charge for her as well.

## 2024-07-08 LAB — T VAGINALIS RRNA SPEC QL NAA+PROBE: NEGATIVE

## 2024-07-17 DIAGNOSIS — G43.829 MENSTRUAL MIGRAINE WITHOUT STATUS MIGRAINOSUS, NOT INTRACTABLE: ICD-10-CM

## 2024-07-18 RX ORDER — SUMATRIPTAN 25 MG/1
25 TABLET, FILM COATED ORAL EVERY 2 HOUR PRN
Qty: 30 TABLET | Refills: 0 | Status: SHIPPED | OUTPATIENT
Start: 2024-07-18

## 2024-07-18 NOTE — TELEPHONE ENCOUNTER
MEDICATION REFILL REQUEST:    Future Appointment: 08/19/2024    Last appointment: 10/23/2023 - Virtual visit     Medication requested:   Requested Prescriptions     Pending Prescriptions Disp Refills    SUMAtriptan 25 MG Oral Tab 30 tablet 0     Sig: Take 1 tablet (25 mg total) by mouth every 2 (two) hours as needed for Migraine (MAX 2 TABLETS PER DAY).         Last refill date:   SUMAtriptan 25 MG Oral Tab 30 tablet 0 5/6/2024 --    Sig - Route: Take 1 tablet (25 mg total) by mouth every 2 (two) hours as needed for Migraine (MAX 2 TABLETS PER DAY). - Oral    Sent to pharmacy as: SUMAtriptan Succinate 25 MG Oral Tablet (Imitrex)    E-Prescribing Status: Receipt confirmed by pharmacy (5/6/2024  1:54 PM CDT)        Protocol Status:     Neurology Medications [id number]/17/2024 11:36 PM   Protocol Details In person appointment or virtual visit in the past 6 mos or appointment in next 3 mos

## 2024-09-24 DIAGNOSIS — G43.829 MENSTRUAL MIGRAINE WITHOUT STATUS MIGRAINOSUS, NOT INTRACTABLE: ICD-10-CM

## 2024-09-24 LAB
AMB EXT BILIRUBIN, TOTAL: 0.3 MG/DL
AMB EXT BUN: 9 MG/DL
AMB EXT CALCIUM: 9.5
AMB EXT CHLORIDE: 102
AMB EXT CHOLESTEROL, TOTAL: 186 MG/DL
AMB EXT CMP ALT: 13 U/L
AMB EXT CMP AST: 17 U/L
AMB EXT CREATININE: 0.85 MG/DL
AMB EXT EGFR-AA: 89
AMB EXT GLUCOSE: 94 MG/DL
AMB EXT HDL CHOLESTEROL: 76 MG/DL
AMB EXT HEMATOCRIT: 44.6
AMB EXT HEMOGLOBIN: 13.9
AMB EXT HGBA1C: 5.9 %
AMB EXT LDL CHOLESTEROL, DIRECT: 98 MG/DL
AMB EXT MCV: 90
AMB EXT PLATELETS: 281
AMB EXT POSTASSIUM: 4.3 MMOL/L
AMB EXT SODIUM: 141 MMOL/L
AMB EXT TOTAL PROTEIN: 7
AMB EXT TRIGLYCERIDES: 65 MG/DL
AMB EXT TSH: 1.24 MIU/ML
AMB EXT VLDL: 12 MG/DL
AMB EXT WBC: 6.1 X10(3)UL

## 2024-09-24 RX ORDER — SUMATRIPTAN 25 MG/1
25 TABLET, FILM COATED ORAL EVERY 2 HOUR PRN
Qty: 30 TABLET | Refills: 0 | Status: SHIPPED | OUTPATIENT
Start: 2024-09-24

## 2024-10-21 ENCOUNTER — OFFICE VISIT (OUTPATIENT)
Dept: INTERNAL MEDICINE CLINIC | Facility: CLINIC | Age: 39
End: 2024-10-21
Payer: COMMERCIAL

## 2024-10-21 VITALS
TEMPERATURE: 98 F | SYSTOLIC BLOOD PRESSURE: 138 MMHG | WEIGHT: 293 LBS | BODY MASS INDEX: 51.91 KG/M2 | HEART RATE: 102 BPM | HEIGHT: 63 IN | DIASTOLIC BLOOD PRESSURE: 86 MMHG

## 2024-10-21 DIAGNOSIS — Z00.00 WELLNESS EXAMINATION: Primary | ICD-10-CM

## 2024-10-21 DIAGNOSIS — Z12.31 ENCOUNTER FOR SCREENING MAMMOGRAM FOR MALIGNANT NEOPLASM OF BREAST: ICD-10-CM

## 2024-10-21 DIAGNOSIS — I10 ESSENTIAL HYPERTENSION: ICD-10-CM

## 2024-10-21 DIAGNOSIS — E55.9 VITAMIN D DEFICIENCY: ICD-10-CM

## 2024-10-21 DIAGNOSIS — G43.829 MENSTRUAL MIGRAINE WITHOUT STATUS MIGRAINOSUS, NOT INTRACTABLE: ICD-10-CM

## 2024-10-21 DIAGNOSIS — R60.0 PEDAL EDEMA: ICD-10-CM

## 2024-10-21 RX ORDER — FUROSEMIDE 20 MG/1
20 TABLET ORAL DAILY
Qty: 7 TABLET | Refills: 0 | Status: SHIPPED | OUTPATIENT
Start: 2024-10-21 | End: 2024-10-28

## 2024-10-21 RX ORDER — ERGOCALCIFEROL 1.25 MG/1
50000 CAPSULE, LIQUID FILLED ORAL WEEKLY
Qty: 12 CAPSULE | Refills: 0 | Status: SHIPPED | OUTPATIENT
Start: 2024-10-21 | End: 2025-01-07

## 2024-10-21 RX ORDER — LOSARTAN POTASSIUM 25 MG/1
25 TABLET ORAL DAILY
Qty: 90 TABLET | Refills: 1 | Status: SHIPPED | OUTPATIENT
Start: 2024-10-21

## 2024-10-21 RX ORDER — SUMATRIPTAN 50 MG/1
50 TABLET, FILM COATED ORAL EVERY 2 HOUR PRN
Qty: 30 TABLET | Refills: 1 | Status: SHIPPED | OUTPATIENT
Start: 2024-10-21

## 2024-10-21 NOTE — PROGRESS NOTES
CC: Annual Physical Exam    HPI:   Felix Bryan is a 39 year old female who presents for a complete physical exam.    HCM  -Diet:  Poor diet  -Exercise active  -Mental Health: denies any depression or anxiety sx    Seeing a women health clinic in [place] and did hormonal bloodwork and routine bloodwork like A1c, lipid, CMP/CBC. Having hirsutisms and irregular cycles  Her vitamin D is 14    Having menstual migraines. Sumatriptan helps some    HTN_> Has not been taking her medication.No CP/SOB. Having some right foot swelling fro few weeks  w/o pain or skin changes    Planning to start eating healthier and exercising    Wt Readings from Last 6 Encounters:   10/21/24 295 lb (133.8 kg)   07/03/24 285 lb 3.2 oz (129.4 kg)   05/25/23 263 lb (119.3 kg)   03/31/23 261 lb (118.4 kg)   10/12/22 254 lb (115.2 kg)   05/25/22 258 lb 3.2 oz (117.1 kg)     Body mass index is 52.26 kg/m².     Results for orders placed or performed in visit on 07/03/24   HCG, Beta Subunit, Qual    Collection Time: 07/03/24  2:26 PM   Result Value Ref Range    HCG, Serum Qualitative (S) Negative Negative   T Pallidum Screening [place]    Collection Time: 07/03/24  2:26 PM   Result Value Ref Range    Treponemal Antibodies Nonreactive Nonreactive    HIV Ag/Ab Combo    Collection Time: 07/03/24  2:26 PM   Result Value Ref Range    HIV Antigen Antibody Combo Non-Reactive Non-Reactive       Current Outpatient Medications   Medication Sig Dispense Refill    ergocalciferol 1.25 MG (10102 UT) Oral Cap Take 1 capsule (50,000 Units total) by mouth once a week for 12 doses. 12 capsule 0    SUMAtriptan 50 MG Oral Tab Take 1 tablet (50 mg total) by mouth every 2 (two) hours as needed for Migraine (MAX 2 TABLETS PER DAY). 30 tablet 1    furosemide (LASIX) 20 MG Oral Tab Take 1 tablet (20 mg total) by mouth daily for 7 days. 7 tablet 0    losartan 25 MG Oral Tab Take 1 tablet (25 mg total) by mouth daily. 90 tablet 1    ondansetron 4 MG Oral Tablet Dispersible  Take 1 tablet (4 mg total) by mouth every 8 (eight) hours as needed for Nausea. 15 tablet 0      Allergies[1]   No past medical history on file.   No past surgical history on file.   Family History   Problem Relation Age of Onset    Hypertension Father     Hypertension Mother     Diabetes Mother     Cancer Neg       Social History:   Social History     Socioeconomic History    Marital status: Single   Tobacco Use    Smoking status: Never    Smokeless tobacco: Never   Substance and Sexual Activity    Alcohol use: Not Currently     Comment: occa    Drug use: No    Sexual activity: Yes          REVIEW OF SYSTEMS:   Review of Systems   Constitutional:  Negative for fatigue and fever.   Respiratory:  Negative for cough and shortness of breath.    Cardiovascular:  Negative for chest pain, palpitations and leg swelling.   Gastrointestinal:  Negative for abdominal pain, constipation, diarrhea and vomiting.   Musculoskeletal:  Negative for arthralgias.   Neurological:  Negative for headaches.            PHYSICAL EXAM:   /86   Pulse 102   Temp 98.1 °F (36.7 °C)   Ht 5' 3\" (1.6 m)   Wt 295 lb (133.8 kg)   LMP 04/09/2024 (Approximate)   BMI 52.26 kg/m²  Estimated body mass index is 52.26 kg/m² as calculated from the following:    Height as of this encounter: 5' 3\" (1.6 m).    Weight as of this encounter: 295 lb (133.8 kg).     Wt Readings from Last 3 Encounters:   10/21/24 295 lb (133.8 kg)   07/03/24 285 lb 3.2 oz (129.4 kg)   05/25/23 263 lb (119.3 kg)       Physical Exam  Vitals reviewed.   Constitutional:       General: She is not in acute distress.     Appearance: She is well-developed.   HENT:      Head: Normocephalic.      Right Ear: Tympanic membrane and external ear normal.      Left Ear: Tympanic membrane and external ear normal.   Eyes:      Conjunctiva/sclera: Conjunctivae normal.      Pupils: Pupils are equal, round, and reactive to light.   Neck:      Thyroid: No thyromegaly.   Cardiovascular:       Rate and Rhythm: Normal rate and regular rhythm.      Heart sounds: Normal heart sounds. No murmur heard.  Pulmonary:      Effort: Pulmonary effort is normal. No respiratory distress.      Breath sounds: Normal breath sounds.   Abdominal:      General: Bowel sounds are normal. There is no distension.      Palpations: Abdomen is soft.      Tenderness: There is no abdominal tenderness.   Musculoskeletal:         General: Normal range of motion.      Cervical back: Normal range of motion and neck supple.      Right lower leg: No edema.      Left lower leg: No edema.   Skin:     Findings: No rash.   Neurological:      General: No focal deficit present.      Cranial Nerves: No cranial nerve deficit.      Sensory: No sensory deficit.      Motor: No weakness.      Coordination: Coordination normal.      Gait: Gait normal.           ASSESSMENT AND PLAN:   Felix Bryan is a 39 year old female who presents for a complete physical exam.      Health maintenance, will check : No orders of the defined types were placed in this encounter.      Diagnoses and all orders for this visit:    Wellness examination  -reviewed OSH routine bloodwork from her phone. Advised to upload these PDF results into [employer]    Vitamin D deficiency  -     ergocalciferol 1.25 MG (10382 UT) Oral Cap; Take 1 capsule (50,000 Units total) by mouth once a week for 12 doses.  -OSH 9/2025 showing level of 14    Menstrual migraine without status migrainosus, not intractable  -     SUMAtriptan 50 MG Oral Tab; Take 1 tablet (50 mg total) by mouth every 2 (two) hours as needed for Migraine (MAX 2 TABLETS PER DAY).  -stable overall; increase from 25mg to 50mg    Encounter for screening mammogram for malignant neoplasm of breast  -     [facility] ILANA 2D+3D SCREENING BILAT (CPT=77067/95072); Future    Essential hypertension  -     losartan 25 MG Oral Tab; Take 1 tablet (25 mg total) by mouth daily.  -stable hwoever NONCOMPLIANT; discussed importance of  compliance  -continue meds  -watch salt intake, regular exercise, DASH/heart healthy eating  -monitor home BP regularly and maintain log; if elevated reading >160/100 notify Md.    Pedal edema  -     furosemide (LASIX) 20 MG Oral Tab; Take 1 tablet (20 mg total) by mouth daily for 7 days.  -watch salt intake, regular exercise, DASH/heart healthy eating  -may be also secondary to noncompliance to HTN meds  -also advised weight loss and compression stocking       Health Maintenance Due   Topic Date Due    Annual Physical  03/31/2024    HTN: BP Follow-Up  08/03/2024    COVID-19 Vaccine (3 - 2023-24 season) 09/01/2024    Influenza Vaccine (1) 10/01/2024         The patient indicates understanding of these issues and agrees to the plan.  Return in about 6 months (around 4/21/2025), or if symptoms worsen or fail to improve.  Reasurrance and education provided. All questions answered. Red flags/ ER precautions discussed.      Spent 40 minutes (10 in preventative and  30 in acute/chronic)  including chart review, reviewing appropriate medical history, evaluating patient, discussing treatment options, counseling and education (diet and exercise), ordering appropriate diagnostic tests and completing documentation.             [1]   Allergies  Allergen Reactions    Lisinopril Coughing

## 2025-04-07 DIAGNOSIS — G43.829 MENSTRUAL MIGRAINE WITHOUT STATUS MIGRAINOSUS, NOT INTRACTABLE: ICD-10-CM

## 2025-04-08 RX ORDER — SUMATRIPTAN 50 MG/1
50 TABLET, FILM COATED ORAL EVERY 2 HOUR PRN
Qty: 30 TABLET | Refills: 1 | Status: SHIPPED | OUTPATIENT
Start: 2025-04-08

## 2025-05-04 DIAGNOSIS — I10 ESSENTIAL HYPERTENSION: ICD-10-CM

## 2025-05-05 RX ORDER — LOSARTAN POTASSIUM 25 MG/1
25 TABLET ORAL DAILY
Qty: 90 TABLET | Refills: 1 | Status: SHIPPED | OUTPATIENT
Start: 2025-05-05

## 2025-05-29 DIAGNOSIS — D21.9 FIBROIDS: Primary | ICD-10-CM

## 2025-06-07 ENCOUNTER — HOSPITAL ENCOUNTER (OUTPATIENT)
Dept: MRI IMAGING | Age: 40
End: 2025-06-07
Attending: OBSTETRICS & GYNECOLOGY

## 2025-06-07 DIAGNOSIS — D21.9 FIBROIDS: ICD-10-CM

## 2025-06-07 PROCEDURE — A9585 GADOBUTROL INJECTION: HCPCS | Performed by: OBSTETRICS & GYNECOLOGY

## 2025-06-07 PROCEDURE — 10002805 HB CONTRAST AGENT: Performed by: OBSTETRICS & GYNECOLOGY

## 2025-06-07 PROCEDURE — 72197 MRI PELVIS W/O & W/DYE: CPT

## 2025-06-07 RX ORDER — GADOBUTROL 604.72 MG/ML
11 INJECTION INTRAVENOUS ONCE
Status: COMPLETED | OUTPATIENT
Start: 2025-06-07 | End: 2025-06-07

## 2025-06-07 RX ADMIN — GADOBUTROL 11 ML: 604.72 INJECTION INTRAVENOUS at 10:02

## 2025-08-04 ENCOUNTER — LAB ENCOUNTER (OUTPATIENT)
Dept: LAB | Age: 40
End: 2025-08-04
Attending: FAMILY MEDICINE

## 2025-08-04 ENCOUNTER — OFFICE VISIT (OUTPATIENT)
Age: 40
End: 2025-08-04

## 2025-08-04 VITALS
WEIGHT: 293 LBS | BODY MASS INDEX: 51.91 KG/M2 | RESPIRATION RATE: 18 BRPM | HEART RATE: 108 BPM | TEMPERATURE: 97 F | OXYGEN SATURATION: 98 % | SYSTOLIC BLOOD PRESSURE: 130 MMHG | DIASTOLIC BLOOD PRESSURE: 82 MMHG | HEIGHT: 63 IN

## 2025-08-04 DIAGNOSIS — I10 ESSENTIAL HYPERTENSION: ICD-10-CM

## 2025-08-04 DIAGNOSIS — Z11.3 SCREEN FOR STD (SEXUALLY TRANSMITTED DISEASE): ICD-10-CM

## 2025-08-04 DIAGNOSIS — Z00.00 HEALTHCARE MAINTENANCE: ICD-10-CM

## 2025-08-04 DIAGNOSIS — Z01.818 ENCOUNTER FOR PRE-OPERATIVE EXAMINATION: ICD-10-CM

## 2025-08-04 DIAGNOSIS — Z11.59 NEED FOR HEPATITIS C SCREENING TEST: ICD-10-CM

## 2025-08-04 DIAGNOSIS — D25.9 UTERINE LEIOMYOMA, UNSPECIFIED LOCATION: ICD-10-CM

## 2025-08-04 DIAGNOSIS — Z01.818 ENCOUNTER FOR PRE-OPERATIVE EXAMINATION: Primary | ICD-10-CM

## 2025-08-04 DIAGNOSIS — Z11.59 NEED FOR HEPATITIS B SCREENING TEST: ICD-10-CM

## 2025-08-04 LAB
ALBUMIN SERPL-MCNC: 4.3 G/DL (ref 3.2–4.8)
ALBUMIN/GLOB SERPL: 1.3 (ref 1–2)
ALP LIVER SERPL-CCNC: 95 U/L (ref 37–98)
ALT SERPL-CCNC: 16 U/L (ref 10–49)
ANION GAP SERPL CALC-SCNC: 7 MMOL/L (ref 0–18)
AST SERPL-CCNC: 18 U/L (ref ?–34)
B-HCG SERPL-ACNC: <2.6 MIU/ML (ref ?–4.2)
BASOPHILS # BLD AUTO: 0.03 X10(3) UL (ref 0–0.2)
BASOPHILS NFR BLD AUTO: 0.5 %
BILIRUB SERPL-MCNC: 0.3 MG/DL (ref 0.3–1.2)
BUN BLD-MCNC: 10 MG/DL (ref 9–23)
BUN/CREAT SERPL: 10.2 (ref 10–20)
CALCIUM BLD-MCNC: 9.5 MG/DL (ref 8.7–10.4)
CHLORIDE SERPL-SCNC: 106 MMOL/L (ref 98–112)
CHOLEST SERPL-MCNC: 175 MG/DL (ref ?–200)
CO2 SERPL-SCNC: 29 MMOL/L (ref 21–32)
CREAT BLD-MCNC: 0.98 MG/DL (ref 0.55–1.02)
DEPRECATED RDW RBC AUTO: 49.9 FL (ref 35.1–46.3)
EGFRCR SERPLBLD CKD-EPI 2021: 75 ML/MIN/1.73M2 (ref 60–?)
EOSINOPHIL # BLD AUTO: 0.19 X10(3) UL (ref 0–0.7)
EOSINOPHIL NFR BLD AUTO: 3.3 %
ERYTHROCYTE [DISTWIDTH] IN BLOOD BY AUTOMATED COUNT: 16.7 % (ref 11–15)
EST. AVERAGE GLUCOSE BLD GHB EST-MCNC: 126 MG/DL (ref 68–126)
FASTING PATIENT LIPID ANSWER: NO
FASTING STATUS PATIENT QL REPORTED: NO
GLOBULIN PLAS-MCNC: 3.4 G/DL (ref 2–3.5)
GLUCOSE BLD-MCNC: 91 MG/DL (ref 70–99)
HBA1C MFR BLD: 6 % (ref ?–5.7)
HBV SURFACE AB SER QL: REACTIVE
HBV SURFACE AB SERPL IA-ACNC: >1000 MIU/ML
HCT VFR BLD AUTO: 41.6 % (ref 35–48)
HCV AB SERPL QL IA: NONREACTIVE
HDLC SERPL-MCNC: 78 MG/DL (ref 40–59)
HGB BLD-MCNC: 13.1 G/DL (ref 12–16)
IMM GRANULOCYTES # BLD AUTO: 0.01 X10(3) UL (ref 0–1)
IMM GRANULOCYTES NFR BLD: 0.2 %
LDLC SERPL CALC-MCNC: 86 MG/DL (ref ?–100)
LYMPHOCYTES # BLD AUTO: 2.03 X10(3) UL (ref 1–4)
LYMPHOCYTES NFR BLD AUTO: 35.2 %
MCH RBC QN AUTO: 25.9 PG (ref 26–34)
MCHC RBC AUTO-ENTMCNC: 31.5 G/DL (ref 31–37)
MCV RBC AUTO: 82.4 FL (ref 80–100)
MONOCYTES # BLD AUTO: 0.46 X10(3) UL (ref 0.1–1)
MONOCYTES NFR BLD AUTO: 8 %
NEUTROPHILS # BLD AUTO: 3.04 X10 (3) UL (ref 1.5–7.7)
NEUTROPHILS # BLD AUTO: 3.04 X10(3) UL (ref 1.5–7.7)
NEUTROPHILS NFR BLD AUTO: 52.8 %
NONHDLC SERPL-MCNC: 97 MG/DL (ref ?–130)
OSMOLALITY SERPL CALC.SUM OF ELEC: 293 MOSM/KG (ref 275–295)
PLATELET # BLD AUTO: 345 10(3)UL (ref 150–450)
POTASSIUM SERPL-SCNC: 4.1 MMOL/L (ref 3.5–5.1)
PROT SERPL-MCNC: 7.7 G/DL (ref 5.7–8.2)
RBC # BLD AUTO: 5.05 X10(6)UL (ref 3.8–5.3)
SODIUM SERPL-SCNC: 142 MMOL/L (ref 136–145)
TRIGL SERPL-MCNC: 56 MG/DL (ref 30–149)
VLDLC SERPL CALC-MCNC: 9 MG/DL (ref 0–30)
WBC # BLD AUTO: 5.8 X10(3) UL (ref 4–11)

## 2025-08-04 PROCEDURE — 87086 URINE CULTURE/COLONY COUNT: CPT | Performed by: FAMILY MEDICINE

## 2025-08-04 PROCEDURE — 85025 COMPLETE CBC W/AUTO DIFF WBC: CPT | Performed by: FAMILY MEDICINE

## 2025-08-04 PROCEDURE — 84702 CHORIONIC GONADOTROPIN TEST: CPT | Performed by: FAMILY MEDICINE

## 2025-08-04 PROCEDURE — 86803 HEPATITIS C AB TEST: CPT | Performed by: FAMILY MEDICINE

## 2025-08-04 PROCEDURE — 86706 HEP B SURFACE ANTIBODY: CPT | Performed by: FAMILY MEDICINE

## 2025-08-04 PROCEDURE — 83036 HEMOGLOBIN GLYCOSYLATED A1C: CPT | Performed by: FAMILY MEDICINE

## 2025-08-04 PROCEDURE — 87389 HIV-1 AG W/HIV-1&-2 AB AG IA: CPT | Performed by: FAMILY MEDICINE

## 2025-08-04 PROCEDURE — 80053 COMPREHEN METABOLIC PANEL: CPT | Performed by: FAMILY MEDICINE

## 2025-08-04 PROCEDURE — 80061 LIPID PANEL: CPT | Performed by: FAMILY MEDICINE

## 2025-08-05 LAB
ATRIAL RATE: 83 BPM
P AXIS: 64 DEGREES
P-R INTERVAL: 176 MS
Q-T INTERVAL: 382 MS
QRS DURATION: 88 MS
QTC CALCULATION (BEZET): 448 MS
R AXIS: 22 DEGREES
T AXIS: 51 DEGREES
VENTRICULAR RATE: 83 BPM

## 2025-08-08 ENCOUNTER — APPOINTMENT (OUTPATIENT)
Dept: ADMINISTRATIVE | Facility: HOSPITAL | Age: 40
End: 2025-08-08

## 2025-08-08 RX ORDER — DOCUSATE SODIUM 100 MG/1
100 CAPSULE, LIQUID FILLED ORAL 2 TIMES DAILY PRN
COMMUNITY
Start: 2025-07-16

## 2025-08-08 RX ORDER — FERROUS SULFATE 325(65) MG
325 TABLET, DELAYED RELEASE (ENTERIC COATED) ORAL
COMMUNITY

## 2025-08-20 ENCOUNTER — ANESTHESIA (OUTPATIENT)
Dept: SURGERY | Facility: HOSPITAL | Age: 40
End: 2025-08-20

## 2025-08-20 ENCOUNTER — HOSPITAL ENCOUNTER (OUTPATIENT)
Facility: HOSPITAL | Age: 40
Setting detail: HOSPITAL OUTPATIENT SURGERY
Discharge: HOME | End: 2025-08-20
Attending: OBSTETRICS & GYNECOLOGY | Admitting: OBSTETRICS & GYNECOLOGY

## 2025-08-20 ENCOUNTER — ANESTHESIA EVENT (OUTPATIENT)
Dept: SURGERY | Facility: HOSPITAL | Age: 40
End: 2025-08-20

## 2025-08-20 VITALS
WEIGHT: 293 LBS | RESPIRATION RATE: 18 BRPM | DIASTOLIC BLOOD PRESSURE: 97 MMHG | OXYGEN SATURATION: 96 % | HEIGHT: 63 IN | BODY MASS INDEX: 51.91 KG/M2 | TEMPERATURE: 98 F | HEART RATE: 102 BPM | SYSTOLIC BLOOD PRESSURE: 143 MMHG

## 2025-08-20 DIAGNOSIS — D25.9 UTERINE LEIOMYOMA, UNSPECIFIED LOCATION: Primary | ICD-10-CM

## 2025-08-20 LAB
ANTIBODY SCREEN: NEGATIVE
B-HCG UR QL: NEGATIVE
RH BLOOD TYPE: POSITIVE

## 2025-08-20 PROCEDURE — 86850 RBC ANTIBODY SCREEN: CPT | Performed by: OBSTETRICS & GYNECOLOGY

## 2025-08-20 PROCEDURE — 86900 BLOOD TYPING SEROLOGIC ABO: CPT | Performed by: OBSTETRICS & GYNECOLOGY

## 2025-08-20 PROCEDURE — 86901 BLOOD TYPING SEROLOGIC RH(D): CPT | Performed by: OBSTETRICS & GYNECOLOGY

## 2025-08-20 PROCEDURE — 88305 TISSUE EXAM BY PATHOLOGIST: CPT | Performed by: OBSTETRICS & GYNECOLOGY

## 2025-08-20 PROCEDURE — 86920 COMPATIBILITY TEST SPIN: CPT

## 2025-08-20 PROCEDURE — 81025 URINE PREGNANCY TEST: CPT

## 2025-08-20 DEVICE — IMPLANTABLE DEVICE: Type: IMPLANTABLE DEVICE | Site: ABDOMEN | Status: FUNCTIONAL

## 2025-08-20 RX ORDER — PHENAZOPYRIDINE HYDROCHLORIDE 200 MG/1
200 TABLET, FILM COATED ORAL ONCE
Status: COMPLETED | OUTPATIENT
Start: 2025-08-20 | End: 2025-08-20

## 2025-08-20 RX ORDER — GLYCOPYRROLATE 0.2 MG/ML
INJECTION, SOLUTION INTRAMUSCULAR; INTRAVENOUS AS NEEDED
Status: DISCONTINUED | OUTPATIENT
Start: 2025-08-20 | End: 2025-08-20 | Stop reason: SURG

## 2025-08-20 RX ORDER — HYDROMORPHONE HYDROCHLORIDE 1 MG/ML
0.6 INJECTION, SOLUTION INTRAMUSCULAR; INTRAVENOUS; SUBCUTANEOUS EVERY 5 MIN PRN
Refills: 0 | Status: ACTIVE | OUTPATIENT
Start: 2025-08-20 | End: 2025-08-20

## 2025-08-20 RX ORDER — ENOXAPARIN SODIUM 100 MG/ML
40 INJECTION SUBCUTANEOUS ONCE
Status: COMPLETED | OUTPATIENT
Start: 2025-08-20 | End: 2025-08-20

## 2025-08-20 RX ORDER — SCOPOLAMINE 1 MG/3D
1 PATCH, EXTENDED RELEASE TRANSDERMAL ONCE
Status: DISCONTINUED | OUTPATIENT
Start: 2025-08-20 | End: 2025-08-20 | Stop reason: HOSPADM

## 2025-08-20 RX ORDER — HYDROMORPHONE HYDROCHLORIDE 1 MG/ML
0.2 INJECTION, SOLUTION INTRAMUSCULAR; INTRAVENOUS; SUBCUTANEOUS EVERY 5 MIN PRN
Refills: 0 | Status: ACTIVE | OUTPATIENT
Start: 2025-08-20 | End: 2025-08-20

## 2025-08-20 RX ORDER — LABETALOL HYDROCHLORIDE 5 MG/ML
5 INJECTION, SOLUTION INTRAVENOUS EVERY 5 MIN PRN
Status: ACTIVE | OUTPATIENT
Start: 2025-08-20 | End: 2025-08-20

## 2025-08-20 RX ORDER — HYDROMORPHONE HYDROCHLORIDE 1 MG/ML
INJECTION, SOLUTION INTRAMUSCULAR; INTRAVENOUS; SUBCUTANEOUS
Status: DISCONTINUED
Start: 2025-08-20 | End: 2025-08-20 | Stop reason: WASHOUT

## 2025-08-20 RX ORDER — ONDANSETRON 2 MG/ML
INJECTION INTRAMUSCULAR; INTRAVENOUS AS NEEDED
Status: DISCONTINUED | OUTPATIENT
Start: 2025-08-20 | End: 2025-08-20 | Stop reason: SURG

## 2025-08-20 RX ORDER — HYDROCODONE BITARTRATE AND ACETAMINOPHEN 5; 325 MG/1; MG/1
1-2 TABLET ORAL EVERY 4 HOURS PRN
Qty: 20 TABLET | Refills: 0 | Status: SHIPPED | OUTPATIENT
Start: 2025-08-20

## 2025-08-20 RX ORDER — MIDAZOLAM HYDROCHLORIDE 1 MG/ML
1 INJECTION INTRAMUSCULAR; INTRAVENOUS EVERY 5 MIN PRN
Status: ACTIVE | OUTPATIENT
Start: 2025-08-20 | End: 2025-08-20

## 2025-08-20 RX ORDER — ONDANSETRON 2 MG/ML
4 INJECTION INTRAMUSCULAR; INTRAVENOUS EVERY 6 HOURS PRN
Status: DISCONTINUED | OUTPATIENT
Start: 2025-08-20 | End: 2025-08-20

## 2025-08-20 RX ORDER — OXYCODONE HYDROCHLORIDE 5 MG/1
5 TABLET ORAL EVERY 4 HOURS PRN
Refills: 0 | Status: DISCONTINUED | OUTPATIENT
Start: 2025-08-20 | End: 2025-08-20

## 2025-08-20 RX ORDER — NALOXONE HYDROCHLORIDE 0.4 MG/ML
0.08 INJECTION, SOLUTION INTRAMUSCULAR; INTRAVENOUS; SUBCUTANEOUS AS NEEDED
Status: ACTIVE | OUTPATIENT
Start: 2025-08-20 | End: 2025-08-20

## 2025-08-20 RX ORDER — BUPIVACAINE HYDROCHLORIDE 5 MG/ML
INJECTION, SOLUTION EPIDURAL; INTRACAUDAL; PERINEURAL AS NEEDED
Status: DISCONTINUED | OUTPATIENT
Start: 2025-08-20 | End: 2025-08-20 | Stop reason: HOSPADM

## 2025-08-20 RX ORDER — ALBUTEROL SULFATE 0.83 MG/ML
2.5 SOLUTION RESPIRATORY (INHALATION) AS NEEDED
Status: ACTIVE | OUTPATIENT
Start: 2025-08-20 | End: 2025-08-20

## 2025-08-20 RX ORDER — ROCURONIUM BROMIDE 10 MG/ML
INJECTION, SOLUTION INTRAVENOUS AS NEEDED
Status: DISCONTINUED | OUTPATIENT
Start: 2025-08-20 | End: 2025-08-20 | Stop reason: SURG

## 2025-08-20 RX ORDER — DEXAMETHASONE SODIUM PHOSPHATE 4 MG/ML
VIAL (ML) INJECTION AS NEEDED
Status: DISCONTINUED | OUTPATIENT
Start: 2025-08-20 | End: 2025-08-20 | Stop reason: SURG

## 2025-08-20 RX ORDER — LIDOCAINE HYDROCHLORIDE 10 MG/ML
INJECTION, SOLUTION EPIDURAL; INFILTRATION; INTRACAUDAL; PERINEURAL AS NEEDED
Status: DISCONTINUED | OUTPATIENT
Start: 2025-08-20 | End: 2025-08-20 | Stop reason: SURG

## 2025-08-20 RX ORDER — CETIRIZINE HYDROCHLORIDE 10 MG/1
10 TABLET ORAL AS NEEDED
COMMUNITY

## 2025-08-20 RX ORDER — SODIUM CHLORIDE, SODIUM LACTATE, POTASSIUM CHLORIDE, CALCIUM CHLORIDE 600; 310; 30; 20 MG/100ML; MG/100ML; MG/100ML; MG/100ML
INJECTION, SOLUTION INTRAVENOUS CONTINUOUS
Status: DISCONTINUED | OUTPATIENT
Start: 2025-08-20 | End: 2025-08-20

## 2025-08-20 RX ORDER — MIDAZOLAM HYDROCHLORIDE 1 MG/ML
INJECTION INTRAMUSCULAR; INTRAVENOUS AS NEEDED
Status: DISCONTINUED | OUTPATIENT
Start: 2025-08-20 | End: 2025-08-20 | Stop reason: SURG

## 2025-08-20 RX ORDER — TRANEXAMIC ACID 10 MG/ML
INJECTION, SOLUTION INTRAVENOUS AS NEEDED
Status: DISCONTINUED | OUTPATIENT
Start: 2025-08-20 | End: 2025-08-20 | Stop reason: SURG

## 2025-08-20 RX ORDER — HYDROMORPHONE HYDROCHLORIDE 1 MG/ML
0.4 INJECTION, SOLUTION INTRAMUSCULAR; INTRAVENOUS; SUBCUTANEOUS EVERY 5 MIN PRN
Refills: 0 | Status: ACTIVE | OUTPATIENT
Start: 2025-08-20 | End: 2025-08-20

## 2025-08-20 RX ORDER — ACETAMINOPHEN 10 MG/ML
INJECTION, SOLUTION INTRAVENOUS AS NEEDED
Status: DISCONTINUED | OUTPATIENT
Start: 2025-08-20 | End: 2025-08-20 | Stop reason: SURG

## 2025-08-20 RX ORDER — KETOROLAC TROMETHAMINE 30 MG/ML
INJECTION, SOLUTION INTRAMUSCULAR; INTRAVENOUS AS NEEDED
Status: DISCONTINUED | OUTPATIENT
Start: 2025-08-20 | End: 2025-08-20 | Stop reason: SURG

## 2025-08-20 RX ORDER — PROCHLORPERAZINE EDISYLATE 5 MG/ML
5 INJECTION INTRAMUSCULAR; INTRAVENOUS EVERY 8 HOURS PRN
Status: DISCONTINUED | OUTPATIENT
Start: 2025-08-20 | End: 2025-08-20

## 2025-08-20 RX ORDER — CIPROFLOXACIN 500 MG/1
500 TABLET, FILM COATED ORAL 2 TIMES DAILY
Qty: 14 TABLET | Refills: 0 | Status: SHIPPED | OUTPATIENT
Start: 2025-08-20 | End: 2025-08-27

## 2025-08-20 RX ORDER — ONDANSETRON 8 MG/1
8 TABLET, ORALLY DISINTEGRATING ORAL EVERY 4 HOURS PRN
Qty: 10 TABLET | Refills: 0 | Status: SHIPPED | OUTPATIENT
Start: 2025-08-20

## 2025-08-20 RX ORDER — ALBUTEROL SULFATE 0.83 MG/ML
SOLUTION RESPIRATORY (INHALATION)
Status: COMPLETED
Start: 2025-08-20 | End: 2025-08-20

## 2025-08-20 RX ORDER — KETAMINE HYDROCHLORIDE 50 MG/ML
INJECTION, SOLUTION INTRAMUSCULAR; INTRAVENOUS AS NEEDED
Status: DISCONTINUED | OUTPATIENT
Start: 2025-08-20 | End: 2025-08-20 | Stop reason: SURG

## 2025-08-20 RX ORDER — MEPERIDINE HYDROCHLORIDE 25 MG/ML
12.5 INJECTION INTRAMUSCULAR; INTRAVENOUS; SUBCUTANEOUS AS NEEDED
Refills: 0 | Status: DISCONTINUED | OUTPATIENT
Start: 2025-08-20 | End: 2025-08-20

## 2025-08-20 RX ORDER — CEFAZOLIN SODIUM IN 0.9 % NACL 3 G/100 ML
3 INTRAVENOUS SOLUTION, PIGGYBACK (ML) INTRAVENOUS ONCE
Status: COMPLETED | OUTPATIENT
Start: 2025-08-20 | End: 2025-08-20

## 2025-08-20 RX ORDER — PHENYLEPHRINE HCL 10 MG/ML
VIAL (ML) INJECTION AS NEEDED
Status: DISCONTINUED | OUTPATIENT
Start: 2025-08-20 | End: 2025-08-20 | Stop reason: SURG

## 2025-08-20 RX ORDER — VASOPRESSIN 20 [USP'U]/ML
INJECTION, SOLUTION INTRAVENOUS AS NEEDED
Status: DISCONTINUED | OUTPATIENT
Start: 2025-08-20 | End: 2025-08-20 | Stop reason: HOSPADM

## 2025-08-20 RX ORDER — ACETAMINOPHEN 500 MG
1000 TABLET ORAL ONCE
Status: DISCONTINUED | OUTPATIENT
Start: 2025-08-20 | End: 2025-08-20 | Stop reason: HOSPADM

## 2025-08-20 RX ORDER — LABETALOL HYDROCHLORIDE 5 MG/ML
INJECTION, SOLUTION INTRAVENOUS
Status: COMPLETED
Start: 2025-08-20 | End: 2025-08-20

## 2025-08-20 RX ADMIN — PHENYLEPHRINE HCL 100 MCG: 10 MG/ML VIAL (ML) INJECTION at 10:07:00

## 2025-08-20 RX ADMIN — SODIUM CHLORIDE, SODIUM LACTATE, POTASSIUM CHLORIDE, CALCIUM CHLORIDE: 600; 310; 30; 20 INJECTION, SOLUTION INTRAVENOUS at 12:34:00

## 2025-08-20 RX ADMIN — ROCURONIUM BROMIDE 30 MG: 10 INJECTION, SOLUTION INTRAVENOUS at 09:53:00

## 2025-08-20 RX ADMIN — PHENYLEPHRINE HCL 100 MCG: 10 MG/ML VIAL (ML) INJECTION at 08:18:00

## 2025-08-20 RX ADMIN — ONDANSETRON 4 MG: 2 INJECTION INTRAMUSCULAR; INTRAVENOUS at 11:54:00

## 2025-08-20 RX ADMIN — LIDOCAINE HYDROCHLORIDE 100 MG: 10 INJECTION, SOLUTION EPIDURAL; INFILTRATION; INTRACAUDAL; PERINEURAL at 08:00:00

## 2025-08-20 RX ADMIN — TRANEXAMIC ACID 1000 MG: 10 INJECTION, SOLUTION INTRAVENOUS at 08:25:00

## 2025-08-20 RX ADMIN — SODIUM CHLORIDE, SODIUM LACTATE, POTASSIUM CHLORIDE, CALCIUM CHLORIDE: 600; 310; 30; 20 INJECTION, SOLUTION INTRAVENOUS at 07:51:00

## 2025-08-20 RX ADMIN — PHENYLEPHRINE HCL 100 MCG: 10 MG/ML VIAL (ML) INJECTION at 10:15:00

## 2025-08-20 RX ADMIN — ACETAMINOPHEN 1000 MG: 10 INJECTION, SOLUTION INTRAVENOUS at 11:53:00

## 2025-08-20 RX ADMIN — KETAMINE HYDROCHLORIDE 10 MG: 50 INJECTION, SOLUTION INTRAMUSCULAR; INTRAVENOUS at 09:21:00

## 2025-08-20 RX ADMIN — CEFAZOLIN SODIUM IN 0.9 % NACL 3 G: 3 G/100 ML INTRAVENOUS SOLUTION, PIGGYBACK (ML) INTRAVENOUS at 08:20:00

## 2025-08-20 RX ADMIN — MIDAZOLAM HYDROCHLORIDE 2 MG: 1 INJECTION INTRAMUSCULAR; INTRAVENOUS at 07:53:00

## 2025-08-20 RX ADMIN — KETAMINE HYDROCHLORIDE 10 MG: 50 INJECTION, SOLUTION INTRAMUSCULAR; INTRAVENOUS at 11:20:00

## 2025-08-20 RX ADMIN — ROCURONIUM BROMIDE 50 MG: 10 INJECTION, SOLUTION INTRAVENOUS at 08:00:00

## 2025-08-20 RX ADMIN — KETAMINE HYDROCHLORIDE 40 MG: 50 INJECTION, SOLUTION INTRAMUSCULAR; INTRAVENOUS at 08:30:00

## 2025-08-20 RX ADMIN — DEXAMETHASONE SODIUM PHOSPHATE 8 MG: 4 MG/ML VIAL (ML) INJECTION at 08:08:00

## 2025-08-20 RX ADMIN — GLYCOPYRROLATE 0.1 MG: 0.2 INJECTION, SOLUTION INTRAMUSCULAR; INTRAVENOUS at 08:30:00

## 2025-08-20 RX ADMIN — ROCURONIUM BROMIDE 40 MG: 10 INJECTION, SOLUTION INTRAVENOUS at 08:58:00

## 2025-08-20 RX ADMIN — KETAMINE HYDROCHLORIDE 10 MG: 50 INJECTION, SOLUTION INTRAMUSCULAR; INTRAVENOUS at 10:20:00

## 2025-08-20 RX ADMIN — KETOROLAC TROMETHAMINE 30 MG: 30 INJECTION, SOLUTION INTRAMUSCULAR; INTRAVENOUS at 11:56:00

## 2025-08-21 LAB
BLOOD TYPE BARCODE: 5100
UNIT VOLUME: 350 ML

## (undated) DEVICE — COVER LT HNDL RIG FOR SUR CAM DISP

## (undated) DEVICE — ADHESIVE LIQ 2/3ML VI MASTISOL

## (undated) DEVICE — NEEDLE SPNL 18GA L6IN PNK HUB LL QUINKE BVL

## (undated) DEVICE — SUT PERMA- 0 30IN KS NABSRB BLK 60MM STR R

## (undated) DEVICE — SET TB INFLO FOR TRUCLEAR SYS HYSTEROLUX

## (undated) DEVICE — GLOVE SUR 6 SENSICARE PI PIP CRM PWD F

## (undated) DEVICE — Device

## (undated) DEVICE — SOLUTION IRRIG 3000ML 0.9% NACL FLX CONT

## (undated) DEVICE — SUT COAT VCRL+ 0 27IN UR-6 ABSRB VLT ANTIBACT

## (undated) DEVICE — SOLUTION ANTIFOG VIS SYS CLEARIFY LAPSCP

## (undated) DEVICE — NEEDLE INSUF 13GA L120MM LAP SPR LD BLNT STYL

## (undated) DEVICE — SOLUTION IRRIG 1000ML 0.9% NACL USP BTL

## (undated) DEVICE — LACTATED R 1000ML INJ

## (undated) DEVICE — FILTER SMK FLTR TB L10FT DIA3/8IN PRT ADPT

## (undated) DEVICE — ZZ--DISC-SUB-421016-SUT VCRL 0 L27IN ABSRB VLT L26MM UR-6 5/8-

## (undated) DEVICE — SOLUTION DURAPREP 26ML APPLICATOR

## (undated) DEVICE — SET HYSTEROSCOPIC OUTFLO TB DISP FOR FLD MGMT

## (undated) DEVICE — SUT MCRYL 4-0 18IN PS-2 ABSRB UD 19MM 3/8 CIR

## (undated) DEVICE — GLOVE SUR 7.5 SENSICARE PI PIP CRM PWD F

## (undated) DEVICE — PACK GYN LAP/ROBOTIC

## (undated) DEVICE — PASSER SUT NDL 14GA 5-8MM TRCR 10-12MM SWAB

## (undated) DEVICE — TROCAR 5MM L100 NON BLADED

## (undated) DEVICE — GLOVE SUR 7 SENSICARE PI PIP CRM PWD F

## (undated) DEVICE — SEAL HYSTEROSCOPE TRUCLEAR

## (undated) DEVICE — TUBING INSUFLATION HEATED GRAY

## (undated) NOTE — LETTER
22          Felix GARCIA Bryan  : 1985        To Whom It May Concern: The above named patient is under my care. She tested positive for COVID on 2022. The patient has completed quarantine requirements and is cleared to resume regular activities/travel/work as long as patient remains asymptomatic. Please be advised that it is not recommended to retest for COVID within a 90-day period as the test could still yield positive results, and therefore is not a good tool to use to determine when a patient is cleared to return to normal activities. Please do not hesitate to contact my office if you have further questions or concerns.         Sincerely,        Figueroa Haq MD  Family Medicine

## (undated) NOTE — MR AVS SNAPSHOT
After Visit Summary   9/23/2019    Gabi Dobson    MRN: [id number]           Visit Information     Date & Time  9/23/2019  4:00 PM Provider  Víctor Gilman MD [address] [place] [address], [address] [place] [address],[address], [facility].  Phone  331 10/23/2019 4:20 PM Deborah Hoover, [facility], [address] [place], [place]      Imaging Scheduling Instructions     Around September 23, 2019   Imaging:   US PELVIS EV (TRNS ABD AND ENDOVAG) (AUR=[id number])    Instructions:   To sched Start activities slowly and build up over time Do what you like   Get your heart pumping – brisk walking, biking, swimming Even 10 minute increments are effective and add up over the week   2 ½ hours per week – spread out over time Use a george to keep you $35*      [address]  Monday - Friday  10:00 am - 10:00 pm  Saturday - Sunday  10:00 am - 4:00 pm      [employer]  Monday - Friday  4:00 pm - 10:00 pm  Saturday - Sunday  10:00 am - 4:00

## (undated) NOTE — LETTER
[address] [facility], [facility]  [address] [place] [id number] [phone]            December 12, 2019      Mary [address] [place] [address]      Dear Ms. Felix Bryan,     It h

## (undated) NOTE — LETTER
10/1/2019              Felix Bryan        [address]         Dear Nasra Hollins,    It was a pleasure to see you. Your PAP and HPV testing was normal, repeat in 3 years.   There is no need for further testing at this t